# Patient Record
Sex: FEMALE | Race: WHITE
[De-identification: names, ages, dates, MRNs, and addresses within clinical notes are randomized per-mention and may not be internally consistent; named-entity substitution may affect disease eponyms.]

---

## 2019-03-14 ENCOUNTER — HOSPITAL ENCOUNTER (OUTPATIENT)
Dept: HOSPITAL 47 - BARWHC3 | Age: 29
End: 2019-03-14
Attending: SURGERY
Payer: COMMERCIAL

## 2019-03-14 VITALS
DIASTOLIC BLOOD PRESSURE: 77 MMHG | TEMPERATURE: 97.4 F | SYSTOLIC BLOOD PRESSURE: 110 MMHG | HEART RATE: 78 BPM | RESPIRATION RATE: 16 BRPM

## 2019-03-14 VITALS — BODY MASS INDEX: 34.6 KG/M2

## 2019-03-14 DIAGNOSIS — F32.9: ICD-10-CM

## 2019-03-14 DIAGNOSIS — Z87.891: ICD-10-CM

## 2019-03-14 DIAGNOSIS — E28.2: ICD-10-CM

## 2019-03-14 DIAGNOSIS — E66.01: Primary | ICD-10-CM

## 2019-03-14 PROCEDURE — 93005 ELECTROCARDIOGRAM TRACING: CPT

## 2019-03-14 PROCEDURE — 99211 OFF/OP EST MAY X REQ PHY/QHP: CPT

## 2019-03-14 PROCEDURE — 83036 HEMOGLOBIN GLYCOSYLATED A1C: CPT

## 2019-03-14 NOTE — P.HPBAR
Bariatric H&P





- History & Physicial


H&P Date: 03/14/19


History & Physicial: 


Visit/CC: initial visit





Patient initial contact: 





Initial weight: 106.282 kg


Initial weight in pounds: 234.31


Height: 5 ft 7.75 in


Initial BMI: 34.6





Last weight: 





Current weight: 106.282 kg


Current weight in pounds: 234.31


Current BMI: 34.6





Ideal body weight (based on NIH guidelines): 65.771 kg





Excess body weight loss: 0.0%








The patient is a 28 year-old F who presents for Bariatric Assessment.  Patient 

seen today as a new patient bariatric evaluation.  She was at the center 2 days 

ago.  The patient's sister had previously been gastrectomy.  She is interested 

in sleeve gastrectomy at this time.  The patient has struggled with her weight 

fluctuating over the years.  She was having his diet to 80.  This was when she 

was in high school.  She has had episodes in the distant past where she felt she

may be suffering from anorexia.  Patient has issues currently with depression 

PCO S, and infertility related to her weight.  Patient quit smoking 4 months 

ago.  Denies any history of DVT or dysphagia.  No significant reflux symptoms.














Review of Systems





The patient denies any acute changes in vision or hearing, no dysphagia or 

odynophagia, no chest pain or shortness of breath, no dysuria or hematuria, no 

headache, no runny nose, no rectal bleeding or melena, no unexplained weight 

loss 





Past Medical History


Past Medical History: No Reported History


History of Any Multi-Drug Resistant Organisms: None Reported


Past Surgical History: No Surgical Hx Reported


Past Psychological History: No Psychological Hx Reported


Smoking Status: Former smoker


Past Alcohol Use History: Occasional


Past Drug Use History: None Reported





Surgical - Exam


                                   Vital Signs











Temp Pulse Resp BP


 


 97.4 F L  78   16   110/77 


 


 03/14/19 14:41  03/14/19 14:41  03/14/19 14:41  03/14/19 14:41

















Physical exam:


General: Well-developed, well-nourished


HEENT: Normocephalic, sclerae nonicteric


Abdomen: Nontender, nondistended


Extremities: No edema


Neuro: Alert and oriented





Bariatric Assessment & Plan


(1) Morbid obesity


Narrative/Plan: 


Surgical options reviewed in detail.  Patient interested in sleeve gastrectomy. 

Patient's weight has fluctuated significantly over the years.  I do believe she 

is a good candidate for sleeve gastrectomy.  Await primary care physician 

supporting documentation.  Patient will require preoperative EGD.  Patient will 

follow up post endoscopy prior to scheduling.


Status: Acute   





Bariatric Checklist


Checklist: 


Plan: 





Checklist: 





EGD: 


1. Hiatal hernia: 


2. H. Pylori: 





HgbA1c: 





Vitamin D: 





Smoking: Former smoker





Primary care physician referral: dr whitfield





Psychiatry clearance: 





Cardiology clearance: 





Sleep study: 





Diet journal: 





VTE risk score: 





VTE risk level: 





Rehab needs at discharge:

## 2019-03-15 LAB — HBA1C MFR BLD: 4.9 % (ref 4–6)

## 2019-03-20 ENCOUNTER — HOSPITAL ENCOUNTER (OUTPATIENT)
Dept: HOSPITAL 47 - ORWHC2ENDO | Age: 29
Discharge: HOME | End: 2019-03-20
Attending: SURGERY
Payer: COMMERCIAL

## 2019-03-20 VITALS — HEART RATE: 51 BPM | SYSTOLIC BLOOD PRESSURE: 90 MMHG | DIASTOLIC BLOOD PRESSURE: 54 MMHG

## 2019-03-20 VITALS — BODY MASS INDEX: 36.3 KG/M2

## 2019-03-20 VITALS — TEMPERATURE: 97.9 F

## 2019-03-20 VITALS — RESPIRATION RATE: 16 BRPM

## 2019-03-20 DIAGNOSIS — K44.9: ICD-10-CM

## 2019-03-20 DIAGNOSIS — E66.01: ICD-10-CM

## 2019-03-20 DIAGNOSIS — K29.50: ICD-10-CM

## 2019-03-20 DIAGNOSIS — Z87.891: ICD-10-CM

## 2019-03-20 DIAGNOSIS — K21.9: Primary | ICD-10-CM

## 2019-03-20 PROCEDURE — 43239 EGD BIOPSY SINGLE/MULTIPLE: CPT

## 2019-03-20 PROCEDURE — 88305 TISSUE EXAM BY PATHOLOGIST: CPT

## 2019-03-20 PROCEDURE — 81025 URINE PREGNANCY TEST: CPT

## 2019-03-20 NOTE — P.GSHP
History of Present Illness


H&P Date: 03/20/19


Chief Complaint: GERD, presurgical





Patient here today for upper endoscopy.  Patient recently seen in the bariatric 

clinic.  Patient is scheduled for upcoming sleeve gastrectomy.  Minimal reflux 

symptoms.  No dysphagia.





Past Medical History


Past Medical History: No Reported History


Additional Past Medical History / Comment(s): migraines, palpitations,


History of Any Multi-Drug Resistant Organisms: None Reported


Past Surgical History: No Surgical Hx Reported


Additional Past Surgical History / Comment(s): oral surgery


Past Anesthesia/Blood Transfusion Reactions: No Reported Reaction


Smoking Status: Former smoker





- Past Family History


  ** Mother


Family Medical History: No Reported History





Medications and Allergies


                                Home Medications











 Medication  Instructions  Recorded  Confirmed  Type


 


No Known Home Medications  03/15/19 03/19/19 History








                                    Allergies











Allergy/AdvReac Type Severity Reaction Status Date / Time


 


No Known Allergies Allergy   Verified 03/19/19 14:35














Surgical - Exam


                                   Vital Signs











Temp Pulse Resp BP Pulse Ox


 


 97.9 F   67   18   130/75   100 


 


 03/20/19 11:36  03/20/19 11:36  03/20/19 11:36  03/20/19 11:36  03/20/19 11:36

















Physical exam:


General: Well-developed, well-nourished


HEENT: Normocephalic, sclerae nonicteric


Abdomen: Nontender, nondistended


Extremities: No edema


Neuro: Alert and oriented





Assessment and Plan


(1) Morbid obesity


Narrative/Plan: 


Will proceed with upper endoscopy at this time


Current Visit: No   Status: Acute   Code(s): E66.01 - MORBID (SEVERE) OBESITY 

DUE TO EXCESS CALORIES   SNOMED Code(s): 352275200

## 2019-03-20 NOTE — P.PCN
Date of Procedure: 03/20/19


Procedure(s) Performed: 


Preoperative Dx: GERD, presurgical


Postoperative Dx: Mild gastritis, small sliding hiatal hernia


Procedure: EGD with Bx


Anesthesia: Sedation


Endoscopist: Dr. Dior


Specimens: Antrum


Endoscopic Procedure:   The patient was on the endoscopy table in the left 

decubitus position.  The Olympus gastroscope was inserted into the oropharynx 

and passed under direct visualization to the region of the third portion of the 

duodenum.  From that point the scope was slowly withdrawn inspecting all 

surfaces carefully.  There were no neoplastic inflammatory or polypoid lesions 

throughout the duodenum.  The pylorus was widely patent.  The stomach was 

carefully inspected.  There was mild gastritis present.  A biopsy of the antrum 

took place to rule out H. pylori.  Retroflexion revealed a small sliding hiatal 

hernia.  The GE junction was present 1 cm above the diaphragmatic hiatus.  The 

esophagus was then carefully examined.  There were no neoplastic inflammatory or

polypoid lesions throughout the visualized esophagus.  The patient was then 

taken to the recovery room in stable condition per anesthesia guidelines.


Recommendations: Await biopsy results.

## 2019-07-15 ENCOUNTER — HOSPITAL ENCOUNTER (OUTPATIENT)
Dept: HOSPITAL 47 - LABWHC1 | Age: 29
Discharge: HOME | End: 2019-07-15
Attending: SURGERY
Payer: COMMERCIAL

## 2019-07-15 DIAGNOSIS — F17.200: Primary | ICD-10-CM

## 2019-07-15 PROCEDURE — 80323 ALKALOIDS NOS: CPT

## 2019-10-22 ENCOUNTER — HOSPITAL ENCOUNTER (OUTPATIENT)
Dept: HOSPITAL 47 - BARWHC3 | Age: 29
End: 2019-10-22
Attending: SURGERY
Payer: COMMERCIAL

## 2019-10-22 VITALS
SYSTOLIC BLOOD PRESSURE: 115 MMHG | TEMPERATURE: 98.2 F | DIASTOLIC BLOOD PRESSURE: 84 MMHG | HEART RATE: 101 BPM | RESPIRATION RATE: 16 BRPM

## 2019-10-22 VITALS — BODY MASS INDEX: 37.2 KG/M2

## 2019-10-22 DIAGNOSIS — E66.01: Primary | ICD-10-CM

## 2019-10-22 PROCEDURE — 99211 OFF/OP EST MAY X REQ PHY/QHP: CPT

## 2019-10-22 NOTE — P.BASOAP
Subjective


Progress Note Date: 10/22/19


Principal diagnosis: 





Morbid obesity





Patient here today to discuss sleeve gastrectomy.  Patient was seen earlier this

year and actually underwent upper endoscopy and was scheduled for sleeve 

gastrectomy when she had a change in her insurance coverage.  Surgery was held 

at that time.  She is now interested in resuming her weight loss journey.  She 

is interested in proceeding with sleeve gastrectomy in the near future.  No 

significant changes to her history since last evaluation with the exception of 

starting Zoloft and Flonase.  She has obtained cardiac clearance for history of 

possible SVT in the past a low she states she was told it was likely a panic 

attack.  Her weight has gone up from 234-252.  No recent labs.





Objective





- Vital Signs


Vital signs: 


                                   Vital Signs











Temp  98.2 F   10/22/19 15:02


 


Pulse  101 H  10/22/19 15:02


 


Resp  16   10/22/19 15:02


 


BP  115/84   10/22/19 15:02


 


Pulse Ox      








                                 Intake & Output











 10/21/19 10/22/19 10/22/19





 18:59 06:59 18:59


 


Weight   114.305 kg














- Exam





Abdomen: Soft, nontender, nondistended





Assessment/Plan


(1) Morbid obesity


Narrative/Plan: 


Will schedule for sleeve gastrectomy in the near future.  We'll plan hiatal 

herniorrhaphy simultaneous to the procedure.    The risks of bleeding, 

infection, stenosis, stricture, leak, abscess, fistula formation, peritonitis, p

oor weight loss, reflux, vomiting, conversion to an open procedure, aborting 

sleeve gastrectomy, MI, PE, DVT, and death were discussed.  The patient 

understands and wishes to proceed.Check labs drawn at her primary care 

physician's office earlier today.





Plan: 


Date: 10/22/19





Initial Weight: 106.282 kg





Initial BMI: 34.6





Current Weight: 114.305 kg





Current BMI: 37.2





Type of Surgery: 





Total Volume in Band: 





Previous Volume: 





Volume Removed: 





Volume Added: 





Band Size:

## 2019-11-25 ENCOUNTER — HOSPITAL ENCOUNTER (INPATIENT)
Dept: HOSPITAL 47 - 2ORMAIN | Age: 29
LOS: 2 days | Discharge: HOME | DRG: 621 | End: 2019-11-27
Attending: SURGERY | Admitting: SURGERY
Payer: COMMERCIAL

## 2019-11-25 VITALS — BODY MASS INDEX: 36.1 KG/M2

## 2019-11-25 DIAGNOSIS — F32.9: ICD-10-CM

## 2019-11-25 DIAGNOSIS — Z79.51: ICD-10-CM

## 2019-11-25 DIAGNOSIS — Z87.891: ICD-10-CM

## 2019-11-25 DIAGNOSIS — E28.2: ICD-10-CM

## 2019-11-25 DIAGNOSIS — F41.9: ICD-10-CM

## 2019-11-25 DIAGNOSIS — G43.909: ICD-10-CM

## 2019-11-25 DIAGNOSIS — K44.9: ICD-10-CM

## 2019-11-25 DIAGNOSIS — E66.01: Primary | ICD-10-CM

## 2019-11-25 DIAGNOSIS — Z79.899: ICD-10-CM

## 2019-11-25 PROCEDURE — 84100 ASSAY OF PHOSPHORUS: CPT

## 2019-11-25 PROCEDURE — 82565 ASSAY OF CREATININE: CPT

## 2019-11-25 PROCEDURE — 94762 N-INVAS EAR/PLS OXIMTRY CONT: CPT

## 2019-11-25 PROCEDURE — 94640 AIRWAY INHALATION TREATMENT: CPT

## 2019-11-25 PROCEDURE — 88307 TISSUE EXAM BY PATHOLOGIST: CPT

## 2019-11-25 PROCEDURE — 74240 X-RAY XM UPR GI TRC 1CNTRST: CPT

## 2019-11-25 PROCEDURE — 94760 N-INVAS EAR/PLS OXIMETRY 1: CPT

## 2019-11-25 PROCEDURE — 83735 ASSAY OF MAGNESIUM: CPT

## 2019-11-25 PROCEDURE — 84520 ASSAY OF UREA NITROGEN: CPT

## 2019-11-25 PROCEDURE — 85025 COMPLETE CBC W/AUTO DIFF WBC: CPT

## 2019-11-25 PROCEDURE — 81025 URINE PREGNANCY TEST: CPT

## 2019-11-25 PROCEDURE — 82310 ASSAY OF CALCIUM: CPT

## 2019-11-25 PROCEDURE — 80051 ELECTROLYTE PANEL: CPT

## 2019-11-25 PROCEDURE — 0DB64Z3 EXCISION OF STOMACH, PERCUTANEOUS ENDOSCOPIC APPROACH, VERTICAL: ICD-10-PCS

## 2019-11-25 RX ADMIN — HYDROMORPHONE HYDROCHLORIDE PRN MG: 1 INJECTION, SOLUTION INTRAMUSCULAR; INTRAVENOUS; SUBCUTANEOUS at 15:53

## 2019-11-25 RX ADMIN — HYDROMORPHONE HYDROCHLORIDE PRN MG: 1 INJECTION, SOLUTION INTRAMUSCULAR; INTRAVENOUS; SUBCUTANEOUS at 14:27

## 2019-11-25 RX ADMIN — HYDROMORPHONE HYDROCHLORIDE PRN MG: 1 INJECTION, SOLUTION INTRAMUSCULAR; INTRAVENOUS; SUBCUTANEOUS at 16:26

## 2019-11-25 RX ADMIN — ISODIUM CHLORIDE SCH: 0.03 SOLUTION RESPIRATORY (INHALATION) at 17:32

## 2019-11-25 RX ADMIN — ONDANSETRON ONE MG: 2 INJECTION INTRAMUSCULAR; INTRAVENOUS at 10:47

## 2019-11-25 RX ADMIN — POTASSIUM CHLORIDE SCH MLS: 14.9 INJECTION, SOLUTION INTRAVENOUS at 10:46

## 2019-11-25 RX ADMIN — HYDROMORPHONE HYDROCHLORIDE PRN MG: 1 INJECTION, SOLUTION INTRAMUSCULAR; INTRAVENOUS; SUBCUTANEOUS at 22:13

## 2019-11-25 RX ADMIN — HYDROMORPHONE HYDROCHLORIDE PRN MG: 1 INJECTION, SOLUTION INTRAMUSCULAR; INTRAVENOUS; SUBCUTANEOUS at 18:15

## 2019-11-25 RX ADMIN — ISODIUM CHLORIDE SCH MG: 0.03 SOLUTION RESPIRATORY (INHALATION) at 19:44

## 2019-11-25 RX ADMIN — THIAMINE HYDROCHLORIDE SCH: 100 INJECTION, SOLUTION INTRAMUSCULAR; INTRAVENOUS at 17:53

## 2019-11-25 RX ADMIN — SERTRALINE HYDROCHLORIDE SCH MG: 50 TABLET, FILM COATED ORAL at 21:19

## 2019-11-25 RX ADMIN — ONDANSETRON ONE MG: 2 INJECTION INTRAMUSCULAR; INTRAVENOUS at 15:25

## 2019-11-25 RX ADMIN — ONDANSETRON PRN MG: 2 INJECTION INTRAMUSCULAR; INTRAVENOUS at 22:13

## 2019-11-25 RX ADMIN — HYDROMORPHONE HYDROCHLORIDE PRN MG: 1 INJECTION, SOLUTION INTRAMUSCULAR; INTRAVENOUS; SUBCUTANEOUS at 14:32

## 2019-11-25 NOTE — P.GSHP
History of Present Illness


H&P Date: 11/25/19


Chief Complaint: Morbid obesity





29-year-old female known to our service.  Initially seen in March of this year. 

Patient complaining of morbid obesity.  Her sister has had a sleeve gastrectomy 

in the past.  She is interested in only sleeve gastrectomy at this time.  Recent

BMI 35.9.  Patient suffers from depression PCO S and infertility related to her 

weight.  Denies history of DVT or dysphagia.  Minimal reflux symptoms.  Recent 

EGD showed mild gastritis and a small hiatal hernia.





Past Medical History


Past Medical History: No Reported History


Additional Past Medical History / Comment(s): past hx of occasional migraines, 

palpitations,


History of Any Multi-Drug Resistant Organisms: None Reported


Past Surgical History: No Surgical Hx Reported


Additional Past Surgical History / Comment(s): oral surgery, EGD


Past Anesthesia/Blood Transfusion Reactions: No Reported Reaction


Smoking Status: Former smoker





- Past Family History


  ** Mother


Family Medical History: No Reported History





Medications and Allergies


                                Home Medications











 Medication  Instructions  Recorded  Confirmed  Type


 


Fluticasone Nasal [place] [Flonase 2 spray NASAL DAILY 10/23/19 11/21/19 History





Nasal Spray]    


 


Loratadine [Claritin] 10 mg PO DAILY 10/23/19 11/21/19 History


 


Sertraline [Zoloft] 50 mg PO HS 10/23/19 11/21/19 History








                                    Allergies











Allergy/AdvReac Type Severity Reaction Status Date / Time


 


No Known Allergies Allergy   Verified 11/21/19 08:55














Surgical - Exam








Physical exam:


General: Well-developed, well-nourished


HEENT: Normocephalic, sclerae nonicteric


Abdomen: Nontender, nondistended


Extremities: No edema


Neuro: Alert and oriented





Assessment and Plan


(1) Morbid obesity


Narrative/Plan: 


29-year-old female with morbid obesity and recent findings of hiatal hernia.  

We'll proceed with laparoscopic sleeve gastrectomy with hiatal hernia repair.  

The risks of bleeding, infection, stenosis, stricture, leak, abscess, fistula 

formation, peritonitis, poor weight loss, reflux, vomiting, conversion to an 

open procedure, aborting sleeve gastrectomy, MI, PE, DVT, and death were 

discussed.  The patient understands and wishes to proceed.


Current Visit: No   Status: Acute   Code(s): E66.01 - MORBID (SEVERE) OBESITY 

DUE TO EXCESS CALORIES   SNOMED Code(s): 530322817

## 2019-11-25 NOTE — HP
HISTORY AND PHYSICAL



REASON FOR CONSULTATION:

Advice regarding anxiety and depression and other multiple medical issues requested by

Dr. Dior.



PRESENT ILLNESS:

This 29-year-old woman with a past medical history of occasional migraines,

palpitations, anxiety, depression, history of nicotine dependence, being followed by

Dr. Christy Comer in the outpatient setting underwent laparoscopic sleeve gastrectomy

with repair of hiatal hernia by Dr. Dior. Postoperatively, the patient complained of

some pain.  There is no history of palpitations, no history of headache, loss of

consciousness, seizures. No shortness of breath, hematochezia, melena at this time.

The patient is complaining of upper abdominal and lower chest pain as well, radiating

to the back, most likely postoperative.



PAST MEDICAL HISTORY:

History of occasional migraines. History of anxiety, depression, history of nicotine

dependence.



MEDICATIONS:

Prior to admission, home medications are:

1. Zoloft 50 mg p.o. q.h.s.

2. Claritin 10 mg p.o. daily.

3. Nasal spray 2 sprays daily.



ALLERGIES:

None.



FAMILY HISTORY:

No history of heart disease or strokes in the family.



SOCIAL HISTORY:

Previous history of smoking.  No history of current smoking or alcohol intake.



REVIEW OF SYSTEMS:

ENT: No diminished vision. No diminished hearing.

CARDIOVASCULAR: No angina or palpitations.

RESPIRATIONS:  No cough. No hemoptysis.

GI as mentioned earlier.

 no dysuria or hematuria.

NERVOUS SYSTEM: No numbness or weakness.

ALLERGY/IMMUNOLOGY: No asthma or hayfever.

MUSCULOSKELETAL as mentioned earlier.

HEMATOLOGY/ONCOLOGY: No history of anemia.

ENDOCRINE:  No history of diabetes or hypothyroidism.

CONSTITUTIONAL: As mentioned earlier.

DERMATOLOGY:  Negative.

RHEUMATOLOGY negative.

PSYCHIATRY as mentioned earlier.



PHYSICAL EXAM:

Patient is alert, oriented x3.  The pulse is 64.  Blood pressure 126/81, respirations

16, temperature 97.8, pulse ox 91 percent on room air.

HEENT: Conjunctivae normal.  Oral mucosa moist.

NECK is no jugular venous distention.  No carotid bruit. No lymph node enlargement.

Cardiovascular systems: S1, S2 muffled.

RESPIRATIONS: Breath sounds diminished in the bases.  A few scattered rhonchi.  No

crackles.

ABDOMEN:  Soft, obese, status post surgery.

LEGS:  No edema. No swelling.

NERVOUS SYSTEM: Higher functions as mentioned earlier. Moves all 4 limbs.  No focal

motor or sensory deficits.

LYMPHATICS: No lymph nodes palpable in the neck, axillae or groin.

SKIN: No ulcer, no rashes and no bleeding.

JOINTS:  No active deforming arthropathy.



LABS:

Previous labs, preop labs hematology is okay.  CMP is also within normal limits.



ASSESSMENT:

1. Status post laparoscopic sleeve gastrectomy with repair of hiatal hernia.

2. History of migraine.

3. History of palpitations.

4. History of anxiety, depression.

5. Remote history of nicotine dependence.



RECOMMENDATIONS AND DISCUSSION:

In this 29-year-old woman who presented with multiple medical issues, at this time, I

recommend to continue current medications, current management and symptomatic

treatment. Resume the home medications.  DVT prophylaxis.  Pain medications.  Guarded

prognosis because of multiple complex medical issues.  Further recommendations to

follow.  A copy of dictation is being forwarded to Dr. Christy Comer who is the primary

physician.





MMODL / IJN: [id number] / Job#: [id number]

## 2019-11-25 NOTE — P.OP
Date of Procedure: 11/25/19


Procedure(s) Performed: 


PREOPERATIVE DIAGNOSIS: Morbid obesity, PCos, hiatal hernia


POSTOPERATIVE DIAGNOSIS: Same


PROCEDURE: Laparoscopic sleeve gastrectomy with repair hiatal hernia


SURGEON: Barby


EBL: Minimal


ANESTHESIA: General


COMPLICATIONS: None


OPERATIVE PROCEDURE: Patient was placed in the operating table in the supine 

position.  She was placed under general anesthesia at that time.  The abdomen 

was prepped and draped in sterile fashion after the patient was placed in 

lithotomy.  A 5 mm optical trocar was used to enter the abdominal cavity in the 

left upper quadrant.  Insufflation took place to 15 millimeters mercury.  An 

additional right subxiphoid 5 mm trocar was then placed under direct relation 

and then removed.  2 additional 5 mm trochars were placed in the right upper 

quadrant and left upper quadrant under direct visualization and a 15 mm trocar 

in the supraumbilical location.  The liver was retracted using a medium 

Amanda liver retractor through the right subxiphoid trocar site.  The hiatus 

was inspected.  The patient had a small sized hiatal hernia.  Circumferentially 

the phrenoesophageal ligament was incised identifying the actual defect.  The 

right diaphragmatic crura was well visualized.  I was able to bluntly dissect 

and visualize the left diaphragmatic crura.  At that point I moved to the mid 

aspect of the greater curvature the stomach.  The short gastric vasculature was 

divided using a LigaSure device proximally.  I then switched and divided the 

short gastrics distally to a 3-4 cm from the pylorus.  The dissection took place

up to the left diaphragmatic crura at that point.  The posterior short gastrics 

were likewise divided using the LigaSure device.  Once the stomach was fully 

mobilized the blunt tipped 40-Welsh bougie dilator was advanced into the 

stomach and advanced all the way to the prepyloric location.  A black echelon 60

stapler with seam guard was utilized and fired tangentially across the antrum 

taking care to avoid narrowing at the incisura angularis.  Subsequent firings of

the stapler took place.  A total of 4 green echelon 60 staplers with seam guard 

took place proximally staying on the outer edge of our dilator.  Once we reached

the most proximal portion of the stomach a single firing of the gold echelon 60 

stapler without seen guard took place.  This only covered a distance of about 1 

cm. The oral gastric tube was reinserted.  The stomach was insufflated with 

approximately 100 mL of methylene blue.  No evidence of leak or obstruction was 

seen.  The hiatus was then addressed once again.  2 separate 2-0 Ethibond 

sutures were used to reapproximate the crura posteriorly.  This adequately 

closed the diaphragmatic hernia.  Tisseel fibrin glue was then sprayed along the

entire length of the staple line.  No bleeding was identified.  The distal 

aspect of the sleeve was then reapproximated to the gastrosplenic and aj

rocolic ligament using a short running 20 strata fix suture.  This was done to 

prevent kinking or twisting of the sleeve.  The stomach remnant was removed from

the 15 mm trocar site without difficulty.  The fascia at the 15 more site was 

closed using interrupted 0 Vicryl sutures with the laparoscopic suture passer 

and Giovani Sherie technique. The insufflation was evacuated.  The skin at all 

5 incisions were closed using 4-0 Monocryl sutures.  Skin glue was then applied.

## 2019-11-25 NOTE — P.ANPRN
Procedure Note - Anesthesia





- Nerve Block Performed


  ** Bilateral Transversus Abdominis Single


Time Out Performed: Yes


Date of Procedure: 11/25/19


Procedure Start Time: 14:28


Procedure Stop Time: 14:38


Location of Patient: Phase I


Indication: Acute Post-Operative Pain, Requested by Surgeon


Sedation Type: Sedate with meaningful contact maintained


Preparation: Sterile Prep


Position: Supine


Catheter: None


Needle Types: Pajunk


Needle Gauge: 21


Ultrasound used to visualize needle placement: Yes


Ultrasound used to observe medication spread: Yes


Injectate: 0.5% Ropivacaine (see comment for volume) (ropivacaine 0.5% 20 cc + 

decadron 2mg-- per side)


Blood Aspirated: No


Pain Paresthesia on Injection Noted: No


Resistance on Injection: Normal


Image Stored and Saved: Yes


Events: Uneventful and Well Tolerated

## 2019-11-26 LAB
ANION GAP SERPL CALC-SCNC: 11 MMOL/L
BASOPHILS # BLD AUTO: 0 K/UL (ref 0–0.2)
BASOPHILS NFR BLD AUTO: 0 %
BUN SERPL-SCNC: 5 MG/DL (ref 7–17)
CALCIUM SPEC-MCNC: 9 MG/DL (ref 8.4–10.2)
CHLORIDE SERPL-SCNC: 104 MMOL/L (ref 98–107)
CO2 SERPL-SCNC: 23 MMOL/L (ref 22–30)
EOSINOPHIL # BLD AUTO: 0 K/UL (ref 0–0.7)
EOSINOPHIL NFR BLD AUTO: 0 %
ERYTHROCYTE [DISTWIDTH] IN BLOOD BY AUTOMATED COUNT: 4.24 M/UL (ref 3.8–5.4)
ERYTHROCYTE [DISTWIDTH] IN BLOOD: 13.9 % (ref 11.5–15.5)
HCT VFR BLD AUTO: 36.4 % (ref 34–46)
HGB BLD-MCNC: 12.4 GM/DL (ref 11.4–16)
LYMPHOCYTES # SPEC AUTO: 1.8 K/UL (ref 1–4.8)
LYMPHOCYTES NFR SPEC AUTO: 11 %
MAGNESIUM SPEC-SCNC: 1.8 MG/DL (ref 1.6–2.3)
MCH RBC QN AUTO: 29.3 PG (ref 25–35)
MCHC RBC AUTO-ENTMCNC: 34.1 G/DL (ref 31–37)
MCV RBC AUTO: 85.9 FL (ref 80–100)
MONOCYTES # BLD AUTO: 0.6 K/UL (ref 0–1)
MONOCYTES NFR BLD AUTO: 3 %
NEUTROPHILS # BLD AUTO: 13.8 K/UL (ref 1.3–7.7)
NEUTROPHILS NFR BLD AUTO: 85 %
PLATELET # BLD AUTO: 388 K/UL (ref 150–450)
POTASSIUM SERPL-SCNC: 4.1 MMOL/L (ref 3.5–5.1)
SODIUM SERPL-SCNC: 138 MMOL/L (ref 137–145)
WBC # BLD AUTO: 16.3 K/UL (ref 3.8–10.6)

## 2019-11-26 RX ADMIN — FLUTICASONE PROPIONATE SCH: 50 SPRAY, METERED NASAL at 08:14

## 2019-11-26 RX ADMIN — PANTOPRAZOLE SODIUM SCH MG: 40 INJECTION, POWDER, FOR SOLUTION INTRAVENOUS at 08:14

## 2019-11-26 RX ADMIN — ENOXAPARIN SODIUM SCH MG: 40 INJECTION SUBCUTANEOUS at 14:02

## 2019-11-26 RX ADMIN — HYDROMORPHONE HYDROCHLORIDE PRN MG: 1 INJECTION, SOLUTION INTRAMUSCULAR; INTRAVENOUS; SUBCUTANEOUS at 05:54

## 2019-11-26 RX ADMIN — THIAMINE HYDROCHLORIDE SCH: 100 INJECTION, SOLUTION INTRAMUSCULAR; INTRAVENOUS at 06:13

## 2019-11-26 RX ADMIN — ENOXAPARIN SODIUM SCH: 40 INJECTION SUBCUTANEOUS at 05:36

## 2019-11-26 RX ADMIN — METOCLOPRAMIDE PRN MG: 5 INJECTION, SOLUTION INTRAMUSCULAR; INTRAVENOUS at 20:30

## 2019-11-26 RX ADMIN — HYOSCYAMINE SULFATE PRN MG: 0.12 SOLUTION/ DROPS ORAL at 20:40

## 2019-11-26 RX ADMIN — HYDROMORPHONE HYDROCHLORIDE PRN MG: 1 INJECTION, SOLUTION INTRAMUSCULAR; INTRAVENOUS; SUBCUTANEOUS at 11:16

## 2019-11-26 RX ADMIN — SERTRALINE HYDROCHLORIDE SCH MG: 50 TABLET, FILM COATED ORAL at 19:10

## 2019-11-26 RX ADMIN — HYDROMORPHONE HYDROCHLORIDE PRN MG: 1 INJECTION, SOLUTION INTRAMUSCULAR; INTRAVENOUS; SUBCUTANEOUS at 08:14

## 2019-11-26 RX ADMIN — ISODIUM CHLORIDE SCH: 0.03 SOLUTION RESPIRATORY (INHALATION) at 11:37

## 2019-11-26 RX ADMIN — HYOSCYAMINE SULFATE PRN MG: 0.12 SOLUTION/ DROPS ORAL at 14:55

## 2019-11-26 RX ADMIN — POTASSIUM CHLORIDE SCH: 14.9 INJECTION, SOLUTION INTRAVENOUS at 06:13

## 2019-11-26 RX ADMIN — ONDANSETRON PRN MG: 2 INJECTION INTRAMUSCULAR; INTRAVENOUS at 05:53

## 2019-11-26 RX ADMIN — DIMETHICONE PRN MG: 80 TABLET, CHEWABLE ORAL at 19:12

## 2019-11-26 RX ADMIN — ONDANSETRON PRN MG: 2 INJECTION INTRAMUSCULAR; INTRAVENOUS at 11:16

## 2019-11-26 RX ADMIN — LEUCINE, PHENYLALANINE, LYSINE, METHIONINE, ISOLEUCINE, VALINE, HISTIDINE, THREONINE, TRYPTOPHAN, ALANINE, GLYCINE, ARGININE, PROLINE, SERINE, TYROSINE, SODIUM ACETATE, DIBASIC POTASSIUM PHOSPHATE, MAGNESIUM CHLORIDE, SODIUM CHLORIDE, CALCIUM CHLORIDE, DEXTROSE SCH MLS/HR
365; 280; 290; 200; 300; 290; 240; 210; 90; 1035; 515; 575; 340; 250; 20; 340; 261; 51; 59; 33; 15 INJECTION INTRAVENOUS at 18:57

## 2019-11-26 RX ADMIN — ISODIUM CHLORIDE SCH MG: 0.03 SOLUTION RESPIRATORY (INHALATION) at 15:41

## 2019-11-26 RX ADMIN — ISODIUM CHLORIDE SCH MG: 0.03 SOLUTION RESPIRATORY (INHALATION) at 21:01

## 2019-11-26 RX ADMIN — KETOROLAC TROMETHAMINE PRN MG: 30 INJECTION, SOLUTION INTRAMUSCULAR at 14:09

## 2019-11-26 RX ADMIN — LEUCINE, PHENYLALANINE, LYSINE, METHIONINE, ISOLEUCINE, VALINE, HISTIDINE, THREONINE, TRYPTOPHAN, ALANINE, GLYCINE, ARGININE, PROLINE, SERINE, TYROSINE, SODIUM ACETATE, DIBASIC POTASSIUM PHOSPHATE, MAGNESIUM CHLORIDE, SODIUM CHLORIDE, CALCIUM CHLORIDE, DEXTROSE SCH MLS/HR
365; 280; 290; 200; 300; 290; 240; 210; 90; 1035; 515; 575; 340; 250; 20; 340; 261; 51; 59; 33; 15 INJECTION INTRAVENOUS at 08:09

## 2019-11-26 RX ADMIN — METOCLOPRAMIDE PRN MG: 5 INJECTION, SOLUTION INTRAMUSCULAR; INTRAVENOUS at 14:01

## 2019-11-26 RX ADMIN — ISODIUM CHLORIDE SCH MG: 0.03 SOLUTION RESPIRATORY (INHALATION) at 07:13

## 2019-11-26 RX ADMIN — THIAMINE HYDROCHLORIDE SCH: 100 INJECTION, SOLUTION INTRAMUSCULAR; INTRAVENOUS at 05:36

## 2019-11-26 RX ADMIN — KETOROLAC TROMETHAMINE PRN MG: 30 INJECTION, SOLUTION INTRAMUSCULAR at 20:30

## 2019-11-26 NOTE — FL
EXAMINATION TYPE: FL UGI

 

DATE OF EXAM: 11/26/2019

 

CLINICAL HISTORY:  Status post gastric sleeve

 

Contrast: Omnipaque 350 50 mL

 

The patient ingested contrast without difficulty or delay.  Noted are postsurgical changes of gastric
 sleeve.   There is no evidence for leak or obstruction.  Contrast is noted within the duodenum.    

 

IMPRESSION: Post-surgical change of  gastric sleeve without evidence for obstruction or leak at this 
point in time.

## 2019-11-26 NOTE — P.PN
<Sravani Lorenzo LINWOOD - Last Filed: 11/26/19 12:28>





Subjective


Progress Note Date: 11/26/19





CHIEF COMPLAINT: morbid obesity





HISTORY OF PRESENT ILLNESS: Patient is status post laparoscopic sleeve 

gastrectomy with hiatal hernia repair.  Postop day #1.  Patient examined this 

where the bedside.  She reports significant nausea.  She reports having dry 

heaves this morning but no actual emesis.  She complains of gas pain and 

belching.  She denies passing flatus.  Postoperative esophagram negative for 

leak or obstruction.





PHYSICAL EXAM: 


VITAL SIGNS: Reviewed.


GENERAL: Well-developed in no acute distress. 


HEENT:  No sclera icterus. Extraocular movements grossly intact.  Moist buccal 

mucosa. Head is atraumatic, normocephalic. 


ABDOMEN:  Soft.  Nondistended.  Appropriate surgical tenderness.  Laparoscopic 

incision sites clean dry and intact.


NEUROLOGIC: Alert and oriented. Cranial nerves II through XII grossly intact.





ASSESSMENT: 


1.  Morbid obesity, status post laparoscopic sleeve gastrectomy and hiatal 

hernia repair





PLAN: 


1. Continue NPO except for ice chips due to nausea. Continue IV fluids. May 

begin clear liquids if nausea improves throughout the day


2. Continue Zofran. Begin Reglan PRN


3. Increase activity as tolerated


4. Incentive spirometry


5. Pain control








Nurse practitioner note has been reviewed by physician. Signing provider agrees 

with the documented findings, assessment, and plan of care. 








Objective





- Vital Signs


Vital signs: 


                                   Vital Signs











Temp  97.8 F   11/26/19 07:00


 


Pulse  72   11/26/19 07:28


 


Resp  18   11/26/19 07:00


 


BP  135/80   11/26/19 07:00


 


Pulse Ox  96   11/26/19 11:38








                                 Intake & Output











 11/25/19 11/26/19 11/26/19





 18:59 06:59 18:59


 


Intake Total 2150  


 


Output Total 10  


 


Balance 2140  


 


Weight 111 kg  111 kg


 


Intake:   


 


  IV 2150  


 


Output:   


 


  Estimated Blood Loss 10  


 


Other:   


 


  Voiding Method  Toilet Toilet


 


  # Voids  3 1














- Labs


CBC & Chem 7: 


                                 11/26/19 07:46





                                 11/26/19 07:46


Labs: 


                  Abnormal Lab Results - Last 24 Hours (Table)











  11/26/19 11/26/19 Range/Units





  07:46 07:46 


 


WBC  16.3 H   (3.8-10.6)  k/uL


 


Neutrophils #  13.8 H   (1.3-7.7)  k/uL


 


BUN   5 L  (7-17)  mg/dL














<BarbySumanth - Last Filed: 11/26/19 19:44>





Subjective


As above.  Patient doing better.  Her nausea seems improved.  Continue bariatric

clear liquids.  Ambulate.  Discharge late tomorrow or Thursday.








Objective





- Vital Signs


Vital signs: 


                                   Vital Signs











Temp  98.4 F   11/26/19 15:00


 


Pulse  64   11/26/19 15:51


 


Resp  17   11/26/19 15:00


 


BP  125/77   11/26/19 15:00


 


Pulse Ox  95   11/26/19 15:00








                                 Intake & Output











 11/26/19 11/26/19 11/27/19





 06:59 18:59 06:59


 


Intake Total  1021.2 


 


Balance  1021.2 


 


Weight  111 kg 


 


Intake:   


 


  Intake, IV Titration  1021.2 





  Amount   


 


    Mvi, Adult No.4 with Vit  1021.2 





    K 10 ml Thiamine 100 mg   





    Folic Acid 1 mg Potassium   





    Chloride 20 meq In   





    Sodium Chloride 0.9% 1,   





    000 ml @ 100 mls/hr IV .   





    BY DURATION [facility] Rx#:   





    [id number]   


 


Other:   


 


  Voiding Method Toilet Toilet 


 


  # Voids 3 1 














- Labs


CBC & Chem 7: 


                                 11/26/19 07:46





                                 11/26/19 07:46


Labs: 


                  Abnormal Lab Results - Last 24 Hours (Table)











  11/26/19 11/26/19 Range/Units





  07:46 07:46 


 


WBC  16.3 H   (3.8-10.6)  k/uL


 


Neutrophils #  13.8 H   (1.3-7.7)  k/uL


 


BUN   5 L  (7-17)  mg/dL














Assessment and Plan


(1) Morbid obesity


Current Visit: Yes   Status: Acute   Code(s): E66.01 - MORBID (SEVERE) OBESITY 

DUE TO EXCESS CALORIES   SNOMED Code(s): 567877307

## 2019-11-26 NOTE — PN
PROGRESS NOTE



DATE OF SERVICE:

11/26/2019.



This 29-year-old woman who was admitted after laparoscopic sleeve gastrectomy is being

closely monitored at this time.  Upper GI series showed postsurgical changes.  No chest

pain.  No palpitations.  No fever.



EXAM:

Alert and oriented x3.  Pulse is 57, blood pressure 127/77, respiration 17, temperature

98.4, pulse ox 94% on room air.

HEENT: Conjunctivae normal.

NECK: No jugular venous distention.

CARDIOVASCULAR SYSTEM:  S1, S2 muffled.

RESPIRATORY SYSTEM: Breath sounds diminished at the bases.  A few rhonchi.  No

crackles.

ABDOMEN soft, status post surgery.

LEGS are no edema. No swelling.

CENTRAL NERVOUS SYSTEM: No focal deficits.



LABS:

WBC 16.2, hemoglobin 12.4.



ASSESSMENT:

1. Status post laparoscopic sleeve gastrectomy with repair of hiatal hernia.

2. History of migraine.

3. History of palpitations.

4. History of anxiety, depression.

5. Remote history of nicotine dependence.



RECOMMENDATIONS AND DISCUSSION:

Recommend to continue current medications, management and symptomatic treatment,

continue to monitor.  Otherwise continue incentive spirometry.  Continue the rest of

the medications.  DVT prophylaxis.  Further recommendations to follow.



MMODL / IJN: [id number] / Job#: [id number]

## 2019-11-27 ENCOUNTER — HOSPITAL ENCOUNTER (OUTPATIENT)
Dept: HOSPITAL 47 - EC | Age: 29
Setting detail: OBSERVATION
LOS: 2 days | End: 2019-11-29
Attending: SURGERY | Admitting: SURGERY
Payer: COMMERCIAL

## 2019-11-27 VITALS — RESPIRATION RATE: 17 BRPM | DIASTOLIC BLOOD PRESSURE: 63 MMHG | SYSTOLIC BLOOD PRESSURE: 107 MMHG | TEMPERATURE: 98.5 F

## 2019-11-27 VITALS — HEART RATE: 68 BPM

## 2019-11-27 DIAGNOSIS — F32.9: ICD-10-CM

## 2019-11-27 DIAGNOSIS — Z79.899: ICD-10-CM

## 2019-11-27 DIAGNOSIS — Z87.891: ICD-10-CM

## 2019-11-27 DIAGNOSIS — Z86.69: ICD-10-CM

## 2019-11-27 DIAGNOSIS — F41.9: ICD-10-CM

## 2019-11-27 DIAGNOSIS — G89.18: Primary | ICD-10-CM

## 2019-11-27 DIAGNOSIS — Z98.84: ICD-10-CM

## 2019-11-27 DIAGNOSIS — Z98.890: ICD-10-CM

## 2019-11-27 LAB
BASOPHILS # BLD AUTO: 0 K/UL (ref 0–0.2)
BASOPHILS NFR BLD AUTO: 0 %
EOSINOPHIL # BLD AUTO: 0 K/UL (ref 0–0.7)
EOSINOPHIL NFR BLD AUTO: 0 %
ERYTHROCYTE [DISTWIDTH] IN BLOOD BY AUTOMATED COUNT: 4.11 M/UL (ref 3.8–5.4)
ERYTHROCYTE [DISTWIDTH] IN BLOOD: 14.1 % (ref 11.5–15.5)
HCT VFR BLD AUTO: 35.3 % (ref 34–46)
HGB BLD-MCNC: 11.4 GM/DL (ref 11.4–16)
LYMPHOCYTES # SPEC AUTO: 1.8 K/UL (ref 1–4.8)
LYMPHOCYTES NFR SPEC AUTO: 25 %
MCH RBC QN AUTO: 27.8 PG (ref 25–35)
MCHC RBC AUTO-ENTMCNC: 32.4 G/DL (ref 31–37)
MCV RBC AUTO: 85.8 FL (ref 80–100)
MONOCYTES # BLD AUTO: 0.3 K/UL (ref 0–1)
MONOCYTES NFR BLD AUTO: 4 %
NEUTROPHILS # BLD AUTO: 5.2 K/UL (ref 1.3–7.7)
NEUTROPHILS NFR BLD AUTO: 69 %
PLATELET # BLD AUTO: 304 K/UL (ref 150–450)
WBC # BLD AUTO: 7.5 K/UL (ref 3.8–10.6)

## 2019-11-27 PROCEDURE — 96376 TX/PRO/DX INJ SAME DRUG ADON: CPT

## 2019-11-27 PROCEDURE — 81001 URINALYSIS AUTO W/SCOPE: CPT

## 2019-11-27 PROCEDURE — 74176 CT ABD & PELVIS W/O CONTRAST: CPT

## 2019-11-27 PROCEDURE — 81025 URINE PREGNANCY TEST: CPT

## 2019-11-27 PROCEDURE — 36415 COLL VENOUS BLD VENIPUNCTURE: CPT

## 2019-11-27 PROCEDURE — 99285 EMERGENCY DEPT VISIT HI MDM: CPT

## 2019-11-27 PROCEDURE — 80053 COMPREHEN METABOLIC PANEL: CPT

## 2019-11-27 PROCEDURE — 85025 COMPLETE CBC W/AUTO DIFF WBC: CPT

## 2019-11-27 PROCEDURE — 83690 ASSAY OF LIPASE: CPT

## 2019-11-27 PROCEDURE — 96374 THER/PROPH/DIAG INJ IV PUSH: CPT

## 2019-11-27 PROCEDURE — 87086 URINE CULTURE/COLONY COUNT: CPT

## 2019-11-27 PROCEDURE — 96375 TX/PRO/DX INJ NEW DRUG ADDON: CPT

## 2019-11-27 PROCEDURE — 82150 ASSAY OF AMYLASE: CPT

## 2019-11-27 RX ADMIN — DIMETHICONE PRN MG: 80 TABLET, CHEWABLE ORAL at 10:08

## 2019-11-27 RX ADMIN — FLUTICASONE PROPIONATE SCH SPRAY: 50 SPRAY, METERED NASAL at 07:29

## 2019-11-27 RX ADMIN — HYOSCYAMINE SULFATE PRN MG: 0.12 SOLUTION/ DROPS ORAL at 03:11

## 2019-11-27 RX ADMIN — KETOROLAC TROMETHAMINE PRN MG: 30 INJECTION, SOLUTION INTRAMUSCULAR at 11:29

## 2019-11-27 RX ADMIN — HYOSCYAMINE SULFATE PRN MG: 0.12 SOLUTION/ DROPS ORAL at 10:08

## 2019-11-27 RX ADMIN — METOCLOPRAMIDE PRN MG: 5 INJECTION, SOLUTION INTRAMUSCULAR; INTRAVENOUS at 03:11

## 2019-11-27 RX ADMIN — METOCLOPRAMIDE PRN MG: 5 INJECTION, SOLUTION INTRAMUSCULAR; INTRAVENOUS at 11:32

## 2019-11-27 RX ADMIN — PANTOPRAZOLE SODIUM SCH MG: 40 INJECTION, POWDER, FOR SOLUTION INTRAVENOUS at 07:29

## 2019-11-27 RX ADMIN — ISODIUM CHLORIDE SCH MG: 0.03 SOLUTION RESPIRATORY (INHALATION) at 07:13

## 2019-11-27 RX ADMIN — ISODIUM CHLORIDE SCH MG: 0.03 SOLUTION RESPIRATORY (INHALATION) at 11:46

## 2019-11-27 RX ADMIN — ISODIUM CHLORIDE SCH: 0.03 SOLUTION RESPIRATORY (INHALATION) at 16:43

## 2019-11-27 RX ADMIN — KETOROLAC TROMETHAMINE PRN MG: 30 INJECTION, SOLUTION INTRAMUSCULAR at 03:11

## 2019-11-27 RX ADMIN — ENOXAPARIN SODIUM SCH MG: 40 INJECTION SUBCUTANEOUS at 03:11

## 2019-11-27 RX ADMIN — LEUCINE, PHENYLALANINE, LYSINE, METHIONINE, ISOLEUCINE, VALINE, HISTIDINE, THREONINE, TRYPTOPHAN, ALANINE, GLYCINE, ARGININE, PROLINE, SERINE, TYROSINE, SODIUM ACETATE, DIBASIC POTASSIUM PHOSPHATE, MAGNESIUM CHLORIDE, SODIUM CHLORIDE, CALCIUM CHLORIDE, DEXTROSE SCH MLS/HR
365; 280; 290; 200; 300; 290; 240; 210; 90; 1035; 515; 575; 340; 250; 20; 340; 261; 51; 59; 33; 15 INJECTION INTRAVENOUS at 05:48

## 2019-11-27 NOTE — P.PN
Subjective


Progress Note Date: 11/27/19


Principal diagnosis: 





Morbid obesity





Patient doing better today.  Still with some gassy discomfort.  White blood cell

count normal.  12 ounces of liquid so far today.





Objective





- Vital Signs


Vital signs: 


                                   Vital Signs











Temp  98.5 F   11/27/19 07:00


 


Pulse  68   11/27/19 11:57


 


Resp  17   11/27/19 08:00


 


BP  107/63   11/27/19 07:00


 


Pulse Ox  94 L  11/27/19 07:00








                                 Intake & Output











 11/26/19 11/27/19 11/27/19





 18:59 06:59 18:59


 


Intake Total 1021.2 1000 


 


Balance 1021.2 1000 


 


Weight 111 kg  


 


Intake:   


 


  Intake, IV Titration 1021.2 1000 





  Amount   


 


    0.9% NaCl with KCl 20 Meq  1000 





    /l 1,000 ml @ 100 mls/hr   





    IV .BY DURATION [facility] Rx#:   





    [id number]   


 


    Mvi, Adult No.4 with Vit 1021.2  





    K 10 ml Thiamine 100 mg   





    Folic Acid 1 mg Potassium   





    Chloride 20 meq In   





    Sodium Chloride 0.9% 1,   





    000 ml @ 100 mls/hr IV .   





    BY DURATION [facility] Rx#:   





    [id number]   


 


Other:   


 


  Voiding Method Toilet Toilet Toilet


 


  # Voids 1 2 














- Exam





Abdomen: Soft, nondistended, incisions clean and dry





- Labs


CBC & Chem 7: 


                                 11/27/19 10:38





                                 11/26/19 07:46





Assessment and Plan


(1) Morbid obesity


Narrative/Plan: 


Continue bariatric clears.  Monitor intake today.  Possible discharge later 

today or tomorrow.


Current Visit: Yes   Status: Acute   Code(s): E66.01 - MORBID (SEVERE) OBESITY 

DUE TO EXCESS CALORIES   SNOMED Code(s): 133393372

## 2019-11-27 NOTE — P.DS
Providers


Date of admission: 


11/25/19 09:34





Expected date of discharge: 11/27/19


Attending physician: 


Sumanth Dior





Consults: 





                                        





11/25/19 14:09


Consult Physician Routine 


   Consulting Provider: Carlos Napoles


   Consult Reason/Comments: Medical management


   Do you want consulting provider notified?: Yes











Primary care physician: 


Christy Comer








- Discharge Diagnosis(es)


(1) Morbid obesity


Refer to today's progress note.  Patient minute for sleeve gastrectomy.  Doing 

well at this time.  Tolerating liquids.  Will discharge.  Follow-up one week.


Status: Acute   





Plan - Discharge Summary


Discharge Rx Participant: No


New Discharge Prescriptions: 


New


   Bisacodyl [Dulcolax] 5 mg PO DAILY PRN #10 tablet.


     PRN Reason: Constipation


   Simethicone 40 mg/0.6 ml Drops [Mylicon Drops] 40 mg PO PCHS PRN #30 ml


     PRN Reason: gas


   Hydrocodone/Acetaminophen [Norco 5-325] 1 tab PO Q6HR PRN 3 Days #12 tab


     PRN Reason: Pain


   Omeprazole 40 mg PO DAILY #30 cap


   Ondansetron Odt [Zofran Odt] 4 mg PO Q8HR PRN #9 tab


     PRN Reason: Nausea





Continue


   Sertraline [Zoloft] 50 mg PO HS


   Fluticasone Nasal Spray [Flonase Nasal Spray] 2 spray NASAL DAILY


   Loratadine [Claritin] 10 mg PO DAILY


Discharge Medication List





Fluticasone Nasal Spray [Flonase Nasal Spray] 2 spray NASAL DAILY 10/23/19 

[History]


Loratadine [Claritin] 10 mg PO DAILY 10/23/19 [History]


Sertraline [Zoloft] 50 mg PO HS 10/23/19 [History]


Bisacodyl [Dulcolax] 5 mg PO DAILY PRN #10 tablet. 11/26/19 [Rx]


Hydrocodone/Acetaminophen [Norco 5-325] 1 tab PO Q6HR PRN 3 Days #12 tab 

11/26/19 [Rx]


Omeprazole 40 mg PO DAILY #30 cap 11/26/19 [Rx]


Ondansetron Odt [Zofran Odt] 4 mg PO Q8HR PRN #9 tab 11/26/19 [Rx]


Simethicone 40 mg/0.6 ml Drops [Mylicon Drops] 40 mg PO PCHS PRN #30 ml 11/26/19

[Rx]








Follow up Appointment(s)/Referral(s): 


[facility] [[facility]] - 1 Week


Patient Instructions/Handouts:  Laparoscopic Sleeve Gastrectomy (DC)


Activity/Diet/Wound Care/Special Instructions: 


No driving while taking Norco


No lifting over 10 pounds


You may shower. No soaking or tub baths


Very light activity until you are reevaluated at your follow up appointment with

your surgeon


Discharge Disposition: HOME SELF-CARE

## 2019-11-27 NOTE — PN
PROGRESS NOTE



DATE OF SERVICE:

11/27/2019



This is a 29-year-old woman who was admitted after laparoscopic sleeve gastrectomy with

repair of hiatal hernia, is improving significantly.  No chest pain.  No palpitations.

No fever.



PHYSICAL EXAM:

Alert and oriented x3.  Pulse is 75, blood pressure is 107/73, respirations 17,

temperature 98.4, pulse ox 94% on room air.

HEENT: Conjunctivae normal.

NECK: No jugular venous distention.

CARDIOVASCULAR SYSTEM:  S1, S2 muffled.

RESPIRATORY SYSTEM: Breath sounds diminished at the bases, a few rhonchi, no crackles.

ABDOMEN:  Soft, status post surgery.

LEGS:  No edema, no swelling.



LABS:

WBC is 11.2, hemoglobin is 11.4.



ASSESSMENT:

1. Status post laparoscopic sleeve gastrectomy with repair of hiatal hernia.

2. History of migraines.

3. History of palpitation.

4. History of anxiety, depression.

5. Remote history of nicotine dependence.



RECOMMENDATION:

Recommend to continue current medications, continue with the monitoring and symptomatic

treatment, continue incentive spirometry.  Resume the home medications.  Closely follow

up with Dr. Comer, who is the primary physician after discharge. Further

recommendations to follow.





MMODL / IJN: [id number] / Job#: [id number]

## 2019-11-28 LAB
ALBUMIN SERPL-MCNC: 4.3 G/DL (ref 3.5–5)
ALP SERPL-CCNC: 67 U/L (ref 38–126)
ALT SERPL-CCNC: 72 U/L (ref 9–52)
AMYLASE SERPL-CCNC: 38 U/L (ref 30–110)
ANION GAP SERPL CALC-SCNC: 9 MMOL/L
AST SERPL-CCNC: 80 U/L (ref 14–36)
BASOPHILS # BLD AUTO: 0 K/UL (ref 0–0.2)
BASOPHILS NFR BLD AUTO: 0 %
BUN SERPL-SCNC: 7 MG/DL (ref 7–17)
CALCIUM SPEC-MCNC: 9.1 MG/DL (ref 8.4–10.2)
CHLORIDE SERPL-SCNC: 108 MMOL/L (ref 98–107)
CO2 SERPL-SCNC: 21 MMOL/L (ref 22–30)
EOSINOPHIL # BLD AUTO: 0 K/UL (ref 0–0.7)
EOSINOPHIL NFR BLD AUTO: 0 %
ERYTHROCYTE [DISTWIDTH] IN BLOOD BY AUTOMATED COUNT: 4.08 M/UL (ref 3.8–5.4)
ERYTHROCYTE [DISTWIDTH] IN BLOOD: 13.9 % (ref 11.5–15.5)
GLUCOSE SERPL-MCNC: 83 MG/DL (ref 74–99)
HCT VFR BLD AUTO: 34.6 % (ref 34–46)
HGB BLD-MCNC: 11.7 GM/DL (ref 11.4–16)
KETONES UR QL STRIP.AUTO: (no result)
LYMPHOCYTES # SPEC AUTO: 2.5 K/UL (ref 1–4.8)
LYMPHOCYTES NFR SPEC AUTO: 26 %
MCH RBC QN AUTO: 28.8 PG (ref 25–35)
MCHC RBC AUTO-ENTMCNC: 33.9 G/DL (ref 31–37)
MCV RBC AUTO: 84.9 FL (ref 80–100)
MONOCYTES # BLD AUTO: 0.5 K/UL (ref 0–1)
MONOCYTES NFR BLD AUTO: 5 %
NEUTROPHILS # BLD AUTO: 6.3 K/UL (ref 1.3–7.7)
NEUTROPHILS NFR BLD AUTO: 67 %
PH UR: 6 [PH] (ref 5–8)
PLATELET # BLD AUTO: 326 K/UL (ref 150–450)
POTASSIUM SERPL-SCNC: 4.6 MMOL/L (ref 3.5–5.1)
PROT SERPL-MCNC: 7.5 G/DL (ref 6.3–8.2)
RBC UR QL: 3 /HPF (ref 0–5)
SODIUM SERPL-SCNC: 138 MMOL/L (ref 137–145)
SP GR UR: 1.02 (ref 1–1.03)
SQUAMOUS UR QL AUTO: 11 /HPF (ref 0–4)
UROBILINOGEN UR QL STRIP: 2 MG/DL (ref ?–2)
WBC # BLD AUTO: 9.4 K/UL (ref 3.8–10.6)
WBC # UR AUTO: 12 /HPF (ref 0–5)

## 2019-11-28 RX ADMIN — HYDROMORPHONE HYDROCHLORIDE PRN MG: 1 INJECTION, SOLUTION INTRAMUSCULAR; INTRAVENOUS; SUBCUTANEOUS at 03:04

## 2019-11-28 RX ADMIN — FLUTICASONE PROPIONATE SCH SPRAY: 50 SPRAY, METERED NASAL at 08:56

## 2019-11-28 RX ADMIN — SIMETHICONE PRN MG: 20 SUSPENSION/ DROPS ORAL at 19:00

## 2019-11-28 RX ADMIN — HYDROMORPHONE HYDROCHLORIDE PRN MG: 1 INJECTION, SOLUTION INTRAMUSCULAR; INTRAVENOUS; SUBCUTANEOUS at 14:18

## 2019-11-28 RX ADMIN — SIMETHICONE PRN MG: 20 SUSPENSION/ DROPS ORAL at 09:00

## 2019-11-28 RX ADMIN — PANTOPRAZOLE SODIUM SCH MG: 40 TABLET, DELAYED RELEASE ORAL at 08:56

## 2019-11-28 RX ADMIN — FAMOTIDINE SCH MG: 20 TABLET, FILM COATED ORAL at 09:00

## 2019-11-28 RX ADMIN — CEFAZOLIN SCH MLS/HR: 330 INJECTION, POWDER, FOR SOLUTION INTRAMUSCULAR; INTRAVENOUS at 11:14

## 2019-11-28 RX ADMIN — CEFAZOLIN SCH MLS/HR: 330 INJECTION, POWDER, FOR SOLUTION INTRAMUSCULAR; INTRAVENOUS at 20:10

## 2019-11-28 RX ADMIN — FAMOTIDINE SCH MG: 20 TABLET, FILM COATED ORAL at 20:10

## 2019-11-28 RX ADMIN — HYOSCYAMINE SULFATE PRN MG: 0.12 SOLUTION/ DROPS ORAL at 14:17

## 2019-11-28 RX ADMIN — HYDROMORPHONE HYDROCHLORIDE PRN MG: 1 INJECTION, SOLUTION INTRAMUSCULAR; INTRAVENOUS; SUBCUTANEOUS at 11:12

## 2019-11-28 RX ADMIN — LORATADINE SCH MG: 10 TABLET ORAL at 08:56

## 2019-11-28 RX ADMIN — KETOROLAC TROMETHAMINE PRN MG: 30 INJECTION, SOLUTION INTRAMUSCULAR at 18:58

## 2019-11-28 RX ADMIN — CEFAZOLIN SCH MLS/HR: 330 INJECTION, POWDER, FOR SOLUTION INTRAMUSCULAR; INTRAVENOUS at 03:08

## 2019-11-28 RX ADMIN — FAMOTIDINE SCH MG: 20 TABLET, FILM COATED ORAL at 08:56

## 2019-11-28 RX ADMIN — CEFAZOLIN SCH MLS/HR: 330 INJECTION, POWDER, FOR SOLUTION INTRAMUSCULAR; INTRAVENOUS at 21:27

## 2019-11-28 RX ADMIN — METOCLOPRAMIDE PRN MG: 5 INJECTION, SOLUTION INTRAMUSCULAR; INTRAVENOUS at 18:58

## 2019-11-28 NOTE — ED
Abdominal Pain HPI





- General


Chief Complaint: Abdominal Pain


Stated Complaint: post-op, abdominal pain


Time Seen by Provider: 11/28/19 00:17


Source: patient


Mode of arrival: ambulatory


Limitations: no limitations





- History of Present Illness


Initial Comments: 





This patient is a 29-year-old woman who presents with complaint of diffuse 

abdominal pain that is been getting worse over the course of the evening and 

tonight.  The patient did have a gastric sleeve surgery here by zoraida Powers.  

She states that she was feeling pretty good so she requested to go home this 

afternoon although she was scheduled to be in the hospital through tomorrow.  

She states that after she got home, she started having diffuse cramping type 

abdominal pain.  She did take one Norco at around 6 PM, and then one again that 

11:30 PM, and states that those did not seem to be relieving her symptoms.  She 

also has had a little bit of nausea.  No fever.  No cough or dyspnea.  No chest 

pain or palpitations.  No difficulty with urination.


MD Complaint: abdominal pain


-: hour(s)


Location: diffuse


Radiation: none


Migration to: periumbilical


Severity: moderate


Quality: cramping


Consistency: constant


Improves With: other (Walking or rocking)


Worsens With: nothing


Associated Symptoms: nausea





- Related Data


Patient Pregnant: No


                                Home Medications











 Medication  Instructions  Recorded  Confirmed


 


Fluticasone Nasal [place] [Flonase 2 spray NASAL DAILY 10/23/19 12/03/19





Nasal Spray]   


 


Loratadine [Claritin] 10 mg PO DAILY 10/23/19 12/03/19


 


Sertraline [Zoloft] 50 mg PO HS 10/23/19 12/03/19








                                  Previous Rx's











 Medication  Instructions  Recorded


 


Bisacodyl [Dulcolax] 5 mg PO DAILY PRN #10 tablet. 11/26/19


 


Hydrocodone/Acetaminophen [Norco 1 tab PO Q6HR PRN 3 Days #12 tab 11/26/19





5-325]  


 


Omeprazole 40 mg PO DAILY #30 cap 11/26/19


 


Ondansetron Odt [Zofran Odt] 4 mg PO Q8HR PRN #9 tab 11/26/19


 


Simethicone 40 mg/0.6 ml Drops 40 mg PO PCHS PRN #30 ml 11/26/19





[Mylicon Drops]  


 


Hyoscyamine Sulfate [Levsin] 0.125 mg PO Q4H PRN #30 tab 11/29/19


 


traMADol HCl [Ultram] 50 mg PO Q4HR PRN 3 Days #18 tab 11/29/19











                                    Allergies











Allergy/AdvReac Type Severity Reaction Status Date / Time


 


No Known Allergies Allergy   Verified 12/03/19 14:50














Review of Systems


ROS Statement: 


Those systems with pertinent positive or pertinent negative responses have been 

documented in the HPI.





ROS Other: All systems not noted in ROS Statement are negative.


Constitutional: Denies: fever, chills


Respiratory: Denies: cough, dyspnea


Cardiovascular: Denies: chest pain, palpitations, edema, syncope


Gastrointestinal: Reports: abdominal pain, nausea.  Denies: vomiting, diarrhea


Genitourinary: Denies: dysuria, hematuria


Musculoskeletal: Denies: back pain


Skin: Denies: rash


Neurological: Denies: headache, weakness, numbness





Past Medical History


Past Medical History: No Reported History


Additional Past Medical History / Comment(s): past hx of occasional migraines, 

palpitations,


History of Any Multi-Drug Resistant Organisms: None Reported


Past Surgical History: Bariatric Surgery


Additional Past Surgical History / Comment(s): oral surgery, EGD, gastric sleeve


Past Anesthesia/Blood Transfusion Reactions: No Reported Reaction


Past Psychological History: Anxiety, Depression


Smoking Status: Former smoker


Past Alcohol Use History: None Reported


Past Drug Use History: None Reported





- Past Family History


  ** Mother


Family Medical History: No Reported History





General Exam


Limitations: no limitations


General appearance: alert, in no apparent distress


Head exam: Present: atraumatic, normocephalic


Eye exam: Present: normal appearance.  Absent: scleral icterus, conjunctival 

injection


ENT exam: Present: normal oropharynx


Neck exam: Present: normal inspection


Respiratory exam: Present: normal lung sounds bilaterally.  Absent: respiratory 

distress, wheezes, rales, rhonchi, stridor


Cardiovascular Exam: Present: regular rate, normal rhythm, normal heart sounds. 

 Absent: systolic murmur, diastolic murmur, rubs, gallop


GI/Abdominal exam: Present: soft, normal bowel sounds, other (The patient's 

surgical incisions are clean, dry, and intact.  No abnormal warmth or 

erythema.).  Absent: distended, tenderness, guarding, rebound, rigid, mass


Extremities exam: Present: normal inspection, normal capillary refill.  Absent: 

pedal edema, calf tenderness


Back exam: Present: normal inspection.  Absent: CVA tenderness (R), CVA 

tenderness (L)


Neurological exam: Present: alert


Skin exam: Present: warm, dry, intact, normal color.  Absent: rash





Course


                                   Vital Signs











  11/28/19 11/28/19 11/28/19





  00:04 02:00 03:10


 


Temperature 98.2 F 98.8 F 97.7 F


 


Pulse Rate 60 65 


 


Pulse Rate [   64





Pulse Oximetery   





]   


 


Respiratory 20 18 15





Rate   


 


Blood Pressure 119/76 112/72 


 


Blood Pressure   118/73





[Right Arm]   


 


O2 Sat by Pulse 99 98 94 L





Oximetry   














Medical Decision Making





- Lab Data


Result diagrams: 


                                 11/28/19 01:03





                                 11/28/19 01:03


                                   Lab Results











  11/28/19 11/28/19 11/28/19 Range/Units





  01:03 01:03 01:03 


 


WBC   9.4   (3.8-10.6)  k/uL


 


RBC   4.08   (3.80-5.40)  m/uL


 


Hgb   11.7   (11.4-16.0)  gm/dL


 


Hct   34.6   (34.0-46.0)  %


 


MCV   84.9   (80.0-100.0)  fL


 


MCH   28.8   (25.0-35.0)  pg


 


MCHC   33.9   (31.0-37.0)  g/dL


 


RDW   13.9   (11.5-15.5)  %


 


Plt Count   326   (150-450)  k/uL


 


Neutrophils %   67   %


 


Lymphocytes %   26   %


 


Monocytes %   5   %


 


Eosinophils %   0   %


 


Basophils %   0   %


 


Neutrophils #   6.3   (1.3-7.7)  k/uL


 


Lymphocytes #   2.5   (1.0-4.8)  k/uL


 


Monocytes #   0.5   (0-1.0)  k/uL


 


Eosinophils #   0.0   (0-0.7)  k/uL


 


Basophils #   0.0   (0-0.2)  k/uL


 


Sodium  138    (137-145)  mmol/L


 


Potassium  4.6    (3.5-5.1)  mmol/L


 


Chloride  108 H    ()  mmol/L


 


Carbon Dioxide  21 L    (22-30)  mmol/L


 


Anion Gap  9    mmol/L


 


BUN  7    (7-17)  mg/dL


 


Creatinine  0.62    (0.52-1.04)  mg/dL


 


Est GFR (CKD-EPI)AfAm  >90    (>60 ml/min/1.73 sqM)  


 


Est GFR (CKD-EPI)NonAf  >90    (>60 ml/min/1.73 sqM)  


 


Glucose  83    (74-99)  mg/dL


 


Calcium  9.1    (8.4-10.2)  mg/dL


 


Total Bilirubin  0.9    (0.2-1.3)  mg/dL


 


AST  80 H    (14-36)  U/L


 


ALT  72 H    (9-52)  U/L


 


Alkaline Phosphatase  67    ()  U/L


 


Total Protein  7.5    (6.3-8.2)  g/dL


 


Albumin  4.3    (3.5-5.0)  g/dL


 


Amylase  38    ()  U/L


 


Lipase  208    ()  U/L


 


Urine Color     


 


Urine Appearance     (Clear)  


 


Urine pH     (5.0-8.0)  


 


Ur Specific Gravity     (1.001-1.035)  


 


Urine Protein     (Negative)  


 


Urine Glucose (UA)     (Negative)  


 


Urine Ketones     (Negative)  


 


Urine Blood     (Negative)  


 


Urine Nitrite     (Negative)  


 


Urine Bilirubin     (Negative)  


 


Urine Urobilinogen     (<2.0)  mg/dL


 


Ur Leukocyte Esterase     (Negative)  


 


Urine RBC     (0-5)  /hpf


 


Urine WBC     (0-5)  /hpf


 


Ur Squamous Epith Cells     (0-4)  /hpf


 


Urine Mucus     (None)  /hpf


 


Urine HCG, Qual    Not Detected  (Not Detectd)  














  11/28/19 Range/Units





  01:03 


 


WBC   (3.8-10.6)  k/uL


 


RBC   (3.80-5.40)  m/uL


 


Hgb   (11.4-16.0)  gm/dL


 


Hct   (34.0-46.0)  %


 


MCV   (80.0-100.0)  fL


 


MCH   (25.0-35.0)  pg


 


MCHC   (31.0-37.0)  g/dL


 


RDW   (11.5-15.5)  %


 


Plt Count   (150-450)  k/uL


 


Neutrophils %   %


 


Lymphocytes %   %


 


Monocytes %   %


 


Eosinophils %   %


 


Basophils %   %


 


Neutrophils #   (1.3-7.7)  k/uL


 


Lymphocytes #   (1.0-4.8)  k/uL


 


Monocytes #   (0-1.0)  k/uL


 


Eosinophils #   (0-0.7)  k/uL


 


Basophils #   (0-0.2)  k/uL


 


Sodium   (137-145)  mmol/L


 


Potassium   (3.5-5.1)  mmol/L


 


Chloride   ()  mmol/L


 


Carbon Dioxide   (22-30)  mmol/L


 


Anion Gap   mmol/L


 


BUN   (7-17)  mg/dL


 


Creatinine   (0.52-1.04)  mg/dL


 


Est GFR (CKD-EPI)AfAm   (>60 ml/min/1.73 sqM)  


 


Est GFR (CKD-EPI)NonAf   (>60 ml/min/1.73 sqM)  


 


Glucose   (74-99)  mg/dL


 


Calcium   (8.4-10.2)  mg/dL


 


Total Bilirubin   (0.2-1.3)  mg/dL


 


AST   (14-36)  U/L


 


ALT   (9-52)  U/L


 


Alkaline Phosphatase   ()  U/L


 


Total Protein   (6.3-8.2)  g/dL


 


Albumin   (3.5-5.0)  g/dL


 


Amylase   ()  U/L


 


Lipase   ()  U/L


 


Urine Color  Yellow  


 


Urine Appearance  Cloudy H  (Clear)  


 


Urine pH  6.0  (5.0-8.0)  


 


Ur Specific Gravity  1.022  (1.001-1.035)  


 


Urine Protein  Trace H  (Negative)  


 


Urine Glucose (UA)  Negative  (Negative)  


 


Urine Ketones  2+ H  (Negative)  


 


Urine Blood  Negative  (Negative)  


 


Urine Nitrite  Negative  (Negative)  


 


Urine Bilirubin  Negative  (Negative)  


 


Urine Urobilinogen  2.0  (<2.0)  mg/dL


 


Ur Leukocyte Esterase  Large H  (Negative)  


 


Urine RBC  3  (0-5)  /hpf


 


Urine WBC  12 H  (0-5)  /hpf


 


Ur Squamous Epith Cells  11 H  (0-4)  /hpf


 


Urine Mucus  Many H  (None)  /hpf


 


Urine HCG, Qual   (Not Detectd)  














Disposition


Clinical Impression: 


 Abdominal pain





Disposition: ADMITTED AS IP TO THIS [facility]


Condition: Stable

## 2019-11-28 NOTE — P.GSHP
History of Present Illness


H&P Date: 11/28/19


Chief Complaint: Abdominal pain





Patient was seen yesterday and rounds.  She was advised to spend one more night 

however in the afternoon she was doing better.  Her pain was improved and she 

was tolerating her liquids with better volume.  She was quite anxious to go home

yesterday and I released her over the phone.  Apparently she went home continue 

to drink clear liquids then experienced gassy crampy upper mid abdominal pain.  

She came back to the hospital for evaluation.  CT abdomen showed no evidence of 

leak.  Labs are normal.  Doing better this morning.  Pain is improved.  

Continues to tolerate liquids at this time.





- Review of Systems


Comment: 





The patient denies any acute changes in vision or hearing, no dysphagia or tete

nophagia, no chest pain or shortness of breath, no dysuria or hematuria, no 

headache, no runny nose, no rectal bleeding or melena, no unexplained weight 

loss 





Past Medical History


Past Medical History: No Reported History


Additional Past Medical History / Comment(s): past hx of occasional migraines, 

palpitations,


History of Any Multi-Drug Resistant Organisms: None Reported


Past Surgical History: Bariatric Surgery


Additional Past Surgical History / Comment(s): oral surgery, EGD, gastric sleeve


Past Anesthesia/Blood Transfusion Reactions: No Reported Reaction


Past Psychological History: Anxiety, Depression


Smoking Status: Former smoker


Past Alcohol Use History: None Reported


Additional Past Alcohol Use History / Comment(s): quit smoking 12/2018, smoked 

for 6 yrs, < 1 PPD


Past Drug Use History: None Reported





- Past Family History


  ** Mother


Family Medical History: No Reported History





Medications and Allergies


                                Home Medications











 Medication  Instructions  Recorded  Confirmed  Type


 


Fluticasone Nasal [place] [Flonase 2 spray NASAL DAILY 10/23/19 11/28/19 History





Nasal Spray]    


 


Loratadine [Claritin] 10 mg PO DAILY 10/23/19 11/28/19 History


 


Sertraline [Zoloft] 50 mg PO HS 10/23/19 11/28/19 History


 


Bisacodyl [Dulcolax] 5 mg PO DAILY PRN #10 tablet. 11/26/19 11/28/19 Rx


 


Hydrocodone/Acetaminophen [Norco 1 tab PO Q6HR PRN 3 Days #12 tab 11/26/19 11/28/19 Rx





5-325]    


 


Omeprazole 40 mg PO DAILY #30 cap 11/26/19 11/28/19 Rx


 


Ondansetron Odt [Zofran Odt] 4 mg PO Q8HR PRN #9 tab 11/26/19 11/28/19 Rx


 


Simethicone 40 mg/0.6 ml Drops 40 mg PO PCHS PRN #30 ml 11/26/19 11/28/19 Rx





[Mylicon Drops]    








                                    Allergies











Allergy/AdvReac Type Severity Reaction Status Date / Time


 


No Known Allergies Allergy   Verified 11/28/19 07:20














Surgical - Exam


                                   Vital Signs











Temp Pulse Resp BP Pulse Ox


 


 98.2 F   60   20   119/76   99 


 


 11/28/19 00:04  11/28/19 00:04  11/28/19 00:04  11/28/19 00:04  11/28/19 00:04

















Physical exam:


General: Well-developed, well-nourished


HEENT: Normocephalic, sclerae nonicteric


Abdomen: Incisions clean and dry, mild tenderness upper mid abdomen, 

nondistended


Extremities: No edema


Neuro: Alert and oriented





Results





- Labs





                                 11/28/19 01:03





                                 11/28/19 01:03


                  Abnormal Lab Results - Last 24 Hours (Table)











  11/28/19 11/28/19 Range/Units





  01:03 01:03 


 


Chloride  108 H   ()  mmol/L


 


Carbon Dioxide  21 L   (22-30)  mmol/L


 


AST  80 H   (14-36)  U/L


 


ALT  72 H   (9-52)  U/L


 


Urine Appearance   Cloudy H  (Clear)  


 


Urine Protein   Trace H  (Negative)  


 


Urine Ketones   2+ H  (Negative)  


 


Ur Leukocyte Esterase   Large H  (Negative)  


 


Urine WBC   12 H  (0-5)  /hpf


 


Ur Squamous Epith Cells   11 H  (0-4)  /hpf


 


Urine Mucus   Many H  (None)  /hpf








                      Microbiology - Last 24 Hours (Table)











 11/28/19 01:03 Urine Culture - Preliminary





 Urine,Voided 








                                 Diabetes panel











  11/28/19 Range/Units





  01:03 


 


Sodium  138  (137-145)  mmol/L


 


Potassium  4.6  (3.5-5.1)  mmol/L


 


Chloride  108 H  ()  mmol/L


 


Carbon Dioxide  21 L  (22-30)  mmol/L


 


BUN  7  (7-17)  mg/dL


 


Creatinine  0.62  (0.52-1.04)  mg/dL


 


Glucose  83  (74-99)  mg/dL


 


Calcium  9.1  (8.4-10.2)  mg/dL


 


AST  80 H  (14-36)  U/L


 


ALT  72 H  (9-52)  U/L


 


Alkaline Phosphatase  67  ()  U/L


 


Total Protein  7.5  (6.3-8.2)  g/dL


 


Albumin  4.3  (3.5-5.0)  g/dL








                                  Calcium panel











  11/28/19 Range/Units





  01:03 


 


Calcium  9.1  (8.4-10.2)  mg/dL


 


Albumin  4.3  (3.5-5.0)  g/dL








                                 Pituitary panel











  11/28/19 Range/Units





  01:03 


 


Sodium  138  (137-145)  mmol/L


 


Potassium  4.6  (3.5-5.1)  mmol/L


 


Chloride  108 H  ()  mmol/L


 


Carbon Dioxide  21 L  (22-30)  mmol/L


 


BUN  7  (7-17)  mg/dL


 


Creatinine  0.62  (0.52-1.04)  mg/dL


 


Glucose  83  (74-99)  mg/dL


 


Calcium  9.1  (8.4-10.2)  mg/dL








                                  Adrenal panel











  11/28/19 Range/Units





  01:03 


 


Sodium  138  (137-145)  mmol/L


 


Potassium  4.6  (3.5-5.1)  mmol/L


 


Chloride  108 H  ()  mmol/L


 


Carbon Dioxide  21 L  (22-30)  mmol/L


 


BUN  7  (7-17)  mg/dL


 


Creatinine  0.62  (0.52-1.04)  mg/dL


 


Glucose  83  (74-99)  mg/dL


 


Calcium  9.1  (8.4-10.2)  mg/dL


 


Total Bilirubin  0.9  (0.2-1.3)  mg/dL


 


AST  80 H  (14-36)  U/L


 


ALT  72 H  (9-52)  U/L


 


Alkaline Phosphatase  67  ()  U/L


 


Total Protein  7.5  (6.3-8.2)  g/dL


 


Albumin  4.3  (3.5-5.0)  g/dL














Assessment and Plan


(1) Abdominal pain


Narrative/Plan: 


Patient readmitted with abdominal pain.  He is be doing well at this time.  All 

things considered Will plan observation overnight.  Continue bariatric clears.


Current Visit: Yes   Status: Acute   Code(s): R10.9 - UNSPECIFIED ABDOMINAL PAIN

  SNOMED Code(s): 43714259

## 2019-11-28 NOTE — CT
EXAMINATION TYPE: CT abdomen pelvis wo con

 

DATE OF EXAM: 11/28/2019

 

COMPARISON: None

 

HISTORY: Patient presents with post op abdominal pain.

 

CT DLP: 1382 mGycm

Automated exposure control for dose reduction was used.

 

TECHNIQUE:  Helical acquisition of images was performed from the lung bases through the pelvis.

 

FINDINGS: 

 

There is some linear density at the lung bases related to patchy atelectasis. Heart size is normal. T
here is minimal pleural fluid. There is no pericardial effusion.

 

Liver spleen pancreas gallbladder appear normal. Bile ducts are not dilated. There is bariatric gastr
ic surgery noted.

 

There is no adrenal mass. Kidneys have normal size. There is no hydronephrosis. There is no retroperi
toneal adenopathy. Ureters are not dilated. Bladder distends smoothly. There is no inguinal hernia. U
terus is anteverted. The cul-de-sac is clear fluid. There is contrast in the appendix which appears n
ormal.

 

There is no ascites. There is a mild pneumoperitoneum consistent with recent surgery.

 

Lumbar vertebra have normal spacing and alignment. Posterior elements are intact. Bony pelvis is inta
ct.

IMPRESSION: 

POSTSURGICAL CHANGES WITH SMALL PNEUMOPERITONEUM.

 

SMALL PLEURAL EFFUSIONS AND BASILAR PATCHY ATELECTASIS. NORMAL APPENDIX. NO ORAL CONTRAST EXTRAVASATI
ON SEEN. NO EVIDENCE OF AN ABSCESS.

## 2019-11-29 VITALS
DIASTOLIC BLOOD PRESSURE: 70 MMHG | HEART RATE: 57 BPM | TEMPERATURE: 98.1 F | SYSTOLIC BLOOD PRESSURE: 104 MMHG | RESPIRATION RATE: 18 BRPM

## 2019-11-29 RX ADMIN — METOCLOPRAMIDE PRN MG: 5 INJECTION, SOLUTION INTRAMUSCULAR; INTRAVENOUS at 00:55

## 2019-11-29 RX ADMIN — FLUTICASONE PROPIONATE SCH SPRAY: 50 SPRAY, METERED NASAL at 08:21

## 2019-11-29 RX ADMIN — PANTOPRAZOLE SODIUM SCH MG: 40 TABLET, DELAYED RELEASE ORAL at 08:20

## 2019-11-29 RX ADMIN — SIMETHICONE PRN MG: 20 SUSPENSION/ DROPS ORAL at 00:57

## 2019-11-29 RX ADMIN — SIMETHICONE PRN MG: 20 SUSPENSION/ DROPS ORAL at 00:56

## 2019-11-29 RX ADMIN — LORATADINE SCH MG: 10 TABLET ORAL at 08:20

## 2019-11-29 RX ADMIN — SIMETHICONE PRN MG: 20 SUSPENSION/ DROPS ORAL at 08:25

## 2019-11-29 RX ADMIN — HYOSCYAMINE SULFATE PRN MG: 0.12 SOLUTION/ DROPS ORAL at 08:20

## 2019-11-29 RX ADMIN — KETOROLAC TROMETHAMINE PRN MG: 30 INJECTION, SOLUTION INTRAMUSCULAR at 00:55

## 2019-11-29 RX ADMIN — FAMOTIDINE SCH MG: 20 TABLET, FILM COATED ORAL at 08:20

## 2019-11-29 RX ADMIN — CEFAZOLIN SCH: 330 INJECTION, POWDER, FOR SOLUTION INTRAMUSCULAR; INTRAVENOUS at 09:15

## 2019-11-29 NOTE — P.DS
<MauraSravani PALUMBO - Last Filed: 11/29/19 12:11>





Providers


Expected date of discharge: 11/29/19


Hospital Course: 





29 year old female who recently underwent laparoscopic sleeve gastrectomy on 

11/25/2019.  Patient was discharged home in stable condition on 11/27/2019.  She

presented back to the emergency room on 11/28/2019 due to pain. CT abdomen 

pelvis was completed and negative for acute process. She has been tolerating 

liquid diet. Her pain is now controlled on oral medications. She is stable for 

discharge home today. Please see EMR for further hospital course details. 





Discharge Diagnosis: 


1.  Postoperative pain


2.  Recent sleeve gastrectomy





Nurse practitioner note has been reviewed by physician. Signing provider agrees 

with the documented findings, assessment, and plan of care. 





Patient Condition at Discharge: Stable





Plan - Discharge Summary


Discharge Rx Participant: No


New Discharge Prescriptions: 


New


   RX: Hyoscyamine Sulfate [Levsin] 0.125 mg PO Q4H PRN #30 tab


     PRN Reason: gas


   traMADol HCl [Ultram] 50 mg PO Q4HR PRN 3 Days #18 tab


     PRN Reason: Pain





No Action


   RX: Sertraline [Zoloft] 50 mg PO HS


   RX: Fluticasone Nasal Spray [Flonase Nasal Spray] 2 spray NASAL DAILY


   RX: Loratadine [Claritin] 10 mg PO DAILY


   Bisacodyl [Dulcolax] 5 mg PO DAILY PRN #10 tablet.dr


     PRN Reason: Constipation


   RX: Simethicone 40 mg/0.6 ml Drops [Mylicon Drops] 40 mg PO [facility]CHS PRN #30 ml


     PRN Reason: gas


   Hydrocodone/Acetaminophen [Norco 5-325] 1 tab PO Q6HR PRN 3 Days #12 tab


     PRN Reason: Pain


   RX: Omeprazole 40 mg PO DAILY #30 cap


   Ondansetron Odt [Zofran Odt] 4 mg PO Q8HR PRN #9 tab


     PRN Reason: Nausea


Discharge Medication List





RX: Fluticasone Nasal Spray [Flonase Nasal Spray] 2 spray NASAL DAILY 10/23/19 

[History]


RX: Loratadine [Claritin] 10 mg PO DAILY 10/23/19 [History]


RX: Sertraline [Zoloft] 50 mg PO HS 10/23/19 [History]


Bisacodyl [Dulcolax] 5 mg PO DAILY PRN #10 tablet. 11/26/19 [Rx]


Hydrocodone/Acetaminophen [Norco 5-325] 1 tab PO Q6HR PRN 3 Days #12 tab 

11/26/19 [Rx]


Ondansetron Odt [Zofran Odt] 4 mg PO Q8HR PRN #9 tab 11/26/19 [Rx]


RX: Omeprazole 40 mg PO DAILY #30 cap 11/26/19 [Rx]


RX: Simethicone 40 mg/0.6 ml Drops [Mylicon Drops] 40 mg PO PCHS PRN #30 ml 

11/26/19 [Rx]


RX: Hyoscyamine Sulfate [Levsin] 0.125 mg PO Q4H PRN #30 tab 11/29/19 [Rx]


traMADol HCl [Ultram] 50 mg PO Q4HR PRN 3 Days #18 tab 11/29/19 [Rx]








Follow up Appointment(s)/Referral(s): 


Christy Comer MD [Primary Care Provider] - 1-2 days (Office closed at time of

discharge please call Monday December 2nd to set up a follow up appointment)


[facility] [place], [place] [NON-STAFF] - 1 Week (Office closed at time of 

discharge please call Monday December 2nd to set up a follow up appointment)


Patient Instructions/Handouts:  Nutrition after Bariatric Surgery (DC), 

Laparoscopic Sleeve Gastrectomy (DC)





<Sumanth Dior - Last Filed: 11/29/19 13:07>





Providers


Date of admission: 


11/28/19 02:25





Attending physician: 


Sumanth Dior





Primary care physician: 


Christy Comer








- Discharge Diagnosis(es)


(1) Abdominal pain


Current Visit: Yes   Status: Acute

## 2019-11-29 NOTE — P.PN
<MauraSravani LINWOOD - Last Filed: 11/29/19 09:38>





Subjective


Progress Note Date: 11/29/19





CHIEF COMPLAINT: Postoperative pain





HISTORY OF PRESENT ILLNESS: Patient seen and examined at the bedside this 

morning.  She reports her pain has significantly improved.  She is tolerating 

liquid diet.  Passing flatus.  Denies nausea or vomiting.  Vital signs have been

stable





PHYSICAL EXAM: 


VITAL SIGNS: Reviewed.


GENERAL: Well-developed in no acute distress. 


HEENT:  No sclera icterus. Extraocular movements grossly intact.  Moist buccal 

mucosa. Head is atraumatic, normocephalic. 


ABDOMEN:  Soft.  Nondistended. Nontender.  Laparoscopic surgical sites clean dry

and intact without drainage.


NEUROLOGIC: Alert and oriented. Cranial nerves II through XII grossly intact.





ASSESSMENT: 


1.  Postoperative pain


2.  Recent sleeve gastrectomy





PLAN: 


Continue liquid diet


Pain control


Rx for Hyoscyamine and Ultram sent to patient's pharmacy


Anticipate discharge home this afternoon





Nurse practitioner note has been reviewed by physician. Signing provider agrees 

with the documented findings, assessment, and plan of care. 








Objective





- Vital Signs


Vital signs: 


                                   Vital Signs











Temp  98.1 F   11/29/19 07:11


 


Pulse  57 L  11/29/19 07:11


 


Resp  18   11/29/19 07:11


 


BP  104/70   11/29/19 07:11


 


Pulse Ox  97   11/29/19 07:11








                                 Intake & Output











 11/28/19 11/29/19 11/29/19





 18:59 06:59 18:59


 


Intake Total 1000 240 300


 


Balance 1000 240 300


 


Intake:   


 


  IV 1000  


 


    Sodium Chloride 0.9% 1, 1000  





    000 ml @ 125 mls/hr IV .   





    Q8H [facility] Rx#:[id number]   


 


  Oral  240 300


 


Other:   


 


  Voiding Method Toilet  


 


  # Voids  3 


 


  # Bowel Movements 1  














- Labs


CBC & Chem 7: 


                                 11/28/19 01:03





                                 11/28/19 01:03


Labs: 


                      Microbiology - Last 24 Hours (Table)











 11/28/19 01:03 Urine Culture - Preliminary





 Urine,Voided 














<Sumanth Dior - Last Filed: 11/29/19 13:08>





Subjective


As above.  Patient states her pain is improved.  She would like to go home 

today.  Likely discharge.








Objective





- Vital Signs


Vital signs: 


                                   Vital Signs











Temp  98.1 F   11/29/19 07:11


 


Pulse  57 L  11/29/19 07:11


 


Resp  18   11/29/19 07:11


 


BP  104/70   11/29/19 07:11


 


Pulse Ox  97   11/29/19 07:11








                                 Intake & Output











 11/28/19 11/29/19 11/29/19





 18:59 06:59 18:59


 


Intake Total 1000 240 300


 


Balance 1000 240 300


 


Intake:   


 


  IV 1000  


 


    Sodium Chloride 0.9% 1, 1000  





    000 ml @ 125 mls/hr IV .   





    Q8H [facility] Rx#:[id number]   


 


  Oral  240 300


 


Other:   


 


  Voiding Method Toilet  


 


  # Voids  3 


 


  # Bowel Movements 1  














- Labs


CBC & Chem 7: 


                                 11/28/19 01:03





                                 11/28/19 01:03


Labs: 


                      Microbiology - Last 24 Hours (Table)











 11/28/19 01:03 Urine Culture - Preliminary





 Urine,Voided 














Assessment and Plan


(1) Abdominal pain


Current Visit: Yes   Status: Acute   Code(s): R10.9 - UNSPECIFIED ABDOMINAL PAIN

  SNOMED Code(s): 11089193

## 2019-12-03 ENCOUNTER — HOSPITAL ENCOUNTER (OUTPATIENT)
Dept: HOSPITAL 47 - BARWHC3 | Age: 29
Discharge: HOME | End: 2019-12-03
Attending: SURGERY
Payer: COMMERCIAL

## 2019-12-03 VITALS
SYSTOLIC BLOOD PRESSURE: 111 MMHG | DIASTOLIC BLOOD PRESSURE: 75 MMHG | RESPIRATION RATE: 16 BRPM | TEMPERATURE: 98.2 F | HEART RATE: 85 BPM

## 2019-12-03 VITALS — BODY MASS INDEX: 34.9 KG/M2

## 2019-12-03 DIAGNOSIS — E66.01: Primary | ICD-10-CM

## 2019-12-03 PROCEDURE — 97802 MEDICAL NUTRITION INDIV IN: CPT

## 2019-12-03 PROCEDURE — 99211 OFF/OP EST MAY X REQ PHY/QHP: CPT

## 2019-12-03 NOTE — P.BASOAP
Subjective


Progress Note Date: 12/03/19


Principal diagnosis: 





Morbid obesity





Patient returns for 1 week postop visit.  Patient went home only to return later

that night with complaints of nausea and pain.  CAT scan was normal.  Doing 

better at this time.  Minimal nausea.  No vomiting.  Denies heartburn.  

Tolerating 45-60 ounces of liquids per day.  She states she is doing well with 

her protein intake approaching 80 g.  Weight 252 down to 237.  Heart rate 

normal.  No fevers.





Objective





- Vital Signs


Vital signs: 


                                   Vital Signs











Temp  98.2 F   12/03/19 14:08


 


Pulse  85   12/03/19 14:08


 


Resp  16   12/03/19 14:08


 


BP  111/75   12/03/19 14:08


 


Pulse Ox      








                                 Intake & Output











 12/02/19 12/03/19 12/03/19





 18:59 06:59 18:59


 


Weight   107.501 kg














- Exam





Abdomen: Soft, nondistended, incisions clean and dry, minimal tenderness





Assessment/Plan


(1) Morbid obesity


Narrative/Plan: 


Patient doing well overall.  Continue dietary and exercise regimen.  Follow-up 2

-3 weeks for reevaluation.  We'll check one month labs at that time.  Dietary to

see patient today.





Plan: 


Date: 12/03/19





Initial Weight: 106.282 kg





Initial BMI: 34.6





Current Weight: 107.501 kg





Current BMI: 34.9





Type of Surgery: 





Total Volume in Band: 





Previous Volume: 





Volume Removed: 





Volume Added: 





Band Size:

## 2019-12-17 ENCOUNTER — HOSPITAL ENCOUNTER (OUTPATIENT)
Dept: HOSPITAL 47 - BARWHC3 | Age: 29
Discharge: HOME | End: 2019-12-17
Attending: SURGERY
Payer: COMMERCIAL

## 2019-12-17 VITALS — BODY MASS INDEX: 34.5 KG/M2

## 2019-12-17 VITALS
HEART RATE: 85 BPM | SYSTOLIC BLOOD PRESSURE: 114 MMHG | TEMPERATURE: 97.9 F | RESPIRATION RATE: 16 BRPM | DIASTOLIC BLOOD PRESSURE: 79 MMHG

## 2019-12-17 DIAGNOSIS — E66.01: Primary | ICD-10-CM

## 2019-12-17 PROCEDURE — 97803 MED NUTRITION INDIV SUBSEQ: CPT

## 2019-12-17 PROCEDURE — 99211 OFF/OP EST MAY X REQ PHY/QHP: CPT

## 2019-12-17 NOTE — P.BASOAP
Subjective


Progress Note Date: 12/17/19


Principal diagnosis: 





Morbid obesity





Patient here today for reevaluation.  Underwent surgery late November.  

Occasional episodes of stabbing left upper quadrant pain.  No fevers.  No 

tachycardia.  She did have a postop CT scan that was re: normal.  This was done 

the week of surgery.  Marginal but adequate protein and fluid intake.  3 pound 

weight loss since last visit.  No GERD symptoms.  No vomiting.





Objective





- Vital Signs


Vital signs: 


                                   Vital Signs











Temp  97.9 F   12/17/19 15:45


 


Pulse  85   12/17/19 15:45


 


Resp  16   12/17/19 15:45


 


BP  114/79   12/17/19 15:45


 


Pulse Ox      








                                 Intake & Output











 12/17/19 12/17/19 12/18/19





 06:59 18:59 06:59


 


Weight  106.141 kg 














- Exam





Abdomen: Soft, nondistended, incisions clean and dry, minimal tenderness





Assessment/Plan


(1) Morbid obesity


Narrative/Plan: 


Patient doing well at this time.  Patient will monitor her complaints of 

discomfort.  She states they are improving.  We'll order one month labs to be 

drawn next week.  Follow-up 3-4 weeks.





Plan: 


Date: 12/17/19





Initial Weight: 106.282 kg





Initial BMI: 34.6





Current Weight: 106.141 kg





Current BMI: 34.5





Type of Surgery: 





Total Volume in Band: 





Previous Volume: 





Volume Removed: 





Volume Added: 





Band Size:

## 2020-01-15 ENCOUNTER — HOSPITAL ENCOUNTER (OUTPATIENT)
Dept: HOSPITAL 47 - LABWHC1 | Age: 30
Discharge: HOME | End: 2020-01-15
Attending: SURGERY
Payer: COMMERCIAL

## 2020-01-15 DIAGNOSIS — K90.89: Primary | ICD-10-CM

## 2020-01-15 DIAGNOSIS — E66.01: ICD-10-CM

## 2020-01-15 DIAGNOSIS — E55.9: ICD-10-CM

## 2020-01-15 LAB
ERYTHROCYTE [DISTWIDTH] IN BLOOD BY AUTOMATED COUNT: 4.5 M/UL (ref 3.8–5.4)
ERYTHROCYTE [DISTWIDTH] IN BLOOD: 13.8 % (ref 11.5–15.5)
HCT VFR BLD AUTO: 38.8 % (ref 34–46)
HGB BLD-MCNC: 13.1 GM/DL (ref 11.4–16)
MCH RBC QN AUTO: 29.1 PG (ref 25–35)
MCHC RBC AUTO-ENTMCNC: 33.7 G/DL (ref 31–37)
MCV RBC AUTO: 86.4 FL (ref 80–100)
PLATELET # BLD AUTO: 257 K/UL (ref 150–450)
WBC # BLD AUTO: 5 K/UL (ref 3.8–10.6)

## 2020-01-15 PROCEDURE — 83540 ASSAY OF IRON: CPT

## 2020-01-15 PROCEDURE — 83550 IRON BINDING TEST: CPT

## 2020-01-15 PROCEDURE — 82746 ASSAY OF FOLIC ACID SERUM: CPT

## 2020-01-15 PROCEDURE — 82306 VITAMIN D 25 HYDROXY: CPT

## 2020-01-15 PROCEDURE — 82607 VITAMIN B-12: CPT

## 2020-01-15 PROCEDURE — 85027 COMPLETE CBC AUTOMATED: CPT

## 2020-01-15 PROCEDURE — 84425 ASSAY OF VITAMIN B-1: CPT

## 2020-01-15 PROCEDURE — 80053 COMPREHEN METABOLIC PANEL: CPT

## 2020-01-15 PROCEDURE — 36415 COLL VENOUS BLD VENIPUNCTURE: CPT

## 2020-01-16 LAB
ALBUMIN SERPL-MCNC: 4.4 G/DL (ref 3.8–4.9)
ALBUMIN/GLOB SERPL: 2.2 G/DL (ref 1.6–3.17)
ALP SERPL-CCNC: 98 U/L (ref 41–126)
ALT SERPL-CCNC: 38 U/L (ref 8–44)
ANION GAP SERPL CALC-SCNC: 9.5 MMOL/L (ref 4–12)
AST SERPL-CCNC: 35 U/L (ref 13–35)
BUN SERPL-SCNC: 10 MG/DL (ref 9–27)
BUN/CREAT SERPL: 12.5 RATIO (ref 12–20)
CALCIUM SPEC-MCNC: 9.2 MG/DL (ref 8.7–10.3)
CHLORIDE SERPL-SCNC: 106 MMOL/L (ref 96–109)
CO2 SERPL-SCNC: 23.5 MMOL/L (ref 21.6–31.8)
GLOBULIN SER CALC-MCNC: 2 G/DL (ref 1.6–3.3)
GLUCOSE SERPL-MCNC: 84 MG/DL (ref 70–110)
IRON SERPL-MCNC: 44 UG/DL (ref 50–170)
POTASSIUM SERPL-SCNC: 3.7 MMOL/L (ref 3.5–5.5)
PROT SERPL-MCNC: 6.4 G/DL (ref 6.2–8.2)
SODIUM SERPL-SCNC: 139 MMOL/L (ref 135–145)
TIBC SERPL-MCNC: 327 UG/DL (ref 228–460)
VIT B12 SERPL-MCNC: 835 PG/ML (ref 200–944)

## 2020-01-28 ENCOUNTER — HOSPITAL ENCOUNTER (OUTPATIENT)
Dept: HOSPITAL 47 - BARWHC3 | Age: 30
Discharge: HOME | End: 2020-01-28
Attending: SURGERY
Payer: COMMERCIAL

## 2020-01-28 VITALS
TEMPERATURE: 97.8 F | SYSTOLIC BLOOD PRESSURE: 102 MMHG | RESPIRATION RATE: 16 BRPM | DIASTOLIC BLOOD PRESSURE: 70 MMHG | HEART RATE: 64 BPM

## 2020-01-28 VITALS — BODY MASS INDEX: 32.5 KG/M2

## 2020-01-28 DIAGNOSIS — E66.01: Primary | ICD-10-CM

## 2020-01-28 PROCEDURE — 99211 OFF/OP EST MAY X REQ PHY/QHP: CPT

## 2020-01-28 PROCEDURE — 97803 MED NUTRITION INDIV SUBSEQ: CPT

## 2020-01-28 NOTE — P.BASOAP
Subjective


Progress Note Date: 01/28/20


Principal diagnosis: 





Morbid obesity





Doing well since last visit.  Lost another 14lbs.  No GERD symptoms.  She is now

bleeding.  Had food stuck once.  She is due for labs.





Objective





- Vital Signs


Vital signs: 


                                   Vital Signs











Temp  97.8 F   01/28/20 16:16


 


Pulse  64   01/28/20 16:16


 


Resp  16   01/28/20 16:16


 


BP  102/70   01/28/20 16:16


 


Pulse Ox      








                                 Intake & Output











 01/28/20 01/28/20 01/29/20





 06:59 18:59 06:59


 


Weight  99.79 kg 














- Exam





Abdomen: Soft, nontender, nondistended





Assessment/Plan


(1) Morbid obesity


Narrative/Plan: 


Patient doing well at this time.  Check labs at  this time.  Continue dietary 

and exercise regimen.





Plan: 


Date: 01/28/20





Initial Weight: 106.282 kg





Initial BMI: 34.6





Current Weight: 99.79 kg





Current BMI: 32.5





Type of Surgery: 





Total Volume in Band: 





Previous Volume: 





Volume Removed: 





Volume Added: 





Band Size:

## 2020-11-03 ENCOUNTER — HOSPITAL ENCOUNTER (OUTPATIENT)
Dept: HOSPITAL 47 - BARWHC3 | Age: 30
Discharge: HOME | End: 2020-11-03
Attending: SURGERY
Payer: COMMERCIAL

## 2020-11-03 VITALS
TEMPERATURE: 97.9 F | RESPIRATION RATE: 16 BRPM | SYSTOLIC BLOOD PRESSURE: 123 MMHG | DIASTOLIC BLOOD PRESSURE: 83 MMHG | HEART RATE: 65 BPM

## 2020-11-03 VITALS — BODY MASS INDEX: 27.6 KG/M2

## 2020-11-03 DIAGNOSIS — E55.9: ICD-10-CM

## 2020-11-03 DIAGNOSIS — K90.89: ICD-10-CM

## 2020-11-03 DIAGNOSIS — E66.01: ICD-10-CM

## 2020-11-03 DIAGNOSIS — Z48.815: Primary | ICD-10-CM

## 2020-11-03 DIAGNOSIS — Z98.84: ICD-10-CM

## 2020-11-03 LAB
ERYTHROCYTE [DISTWIDTH] IN BLOOD BY AUTOMATED COUNT: 4.2 M/UL (ref 3.8–5.4)
ERYTHROCYTE [DISTWIDTH] IN BLOOD: 12.7 % (ref 11.5–15.5)
HCT VFR BLD AUTO: 37.8 % (ref 34–46)
HGB BLD-MCNC: 12.3 GM/DL (ref 11.4–16)
MCH RBC QN AUTO: 29.2 PG (ref 25–35)
MCHC RBC AUTO-ENTMCNC: 32.5 G/DL (ref 31–37)
MCV RBC AUTO: 89.9 FL (ref 80–100)
PLATELET # BLD AUTO: 263 K/UL (ref 150–450)
WBC # BLD AUTO: 6.4 K/UL (ref 3.8–10.6)

## 2020-11-03 PROCEDURE — 97803 MED NUTRITION INDIV SUBSEQ: CPT

## 2020-11-03 PROCEDURE — 83540 ASSAY OF IRON: CPT

## 2020-11-03 PROCEDURE — 82746 ASSAY OF FOLIC ACID SERUM: CPT

## 2020-11-03 PROCEDURE — 82607 VITAMIN B-12: CPT

## 2020-11-03 PROCEDURE — 99211 OFF/OP EST MAY X REQ PHY/QHP: CPT

## 2020-11-03 PROCEDURE — 80053 COMPREHEN METABOLIC PANEL: CPT

## 2020-11-03 PROCEDURE — 85027 COMPLETE CBC AUTOMATED: CPT

## 2020-11-03 PROCEDURE — 84425 ASSAY OF VITAMIN B-1: CPT

## 2020-11-03 PROCEDURE — 82306 VITAMIN D 25 HYDROXY: CPT

## 2020-11-03 NOTE — P.BASOAP
Subjective


Progress Note Date: 11/03/20


Principal diagnosis: 





Morbid obesity





Patient doing well today.  Underwent sleeve gastrectomy 1 year ago.  Last seen 

in January.  She has lost 33 pounds since last visit.  No nausea or vomiting.  

Complains of fatigue.  She stopped her multivitamin but still takes B12 vitamin 

D and biotin.  She is eating only one meal per day.  Says her multivitamins give

her an upset stomach.  No nausea or vomiting.  No reflux.





Objective





- Vital Signs


Vital signs: 


                                   Vital Signs











Temp  97.9 F   11/03/20 12:59


 


Pulse  65   11/03/20 12:59


 


Resp  16   11/03/20 12:59


 


BP  123/83   11/03/20 12:59


 


Pulse Ox      








                                 Intake & Output











 11/02/20 11/03/20 11/03/20





 18:59 06:59 18:59


 


Weight   84.822 kg














- Exam





Abdomen: Soft, nontender, nondistended





Assessment/Plan


(1) Morbid obesity


Narrative/Plan: 


Patient overall doing well.  Continue dietary and exercise regimen.  Resume 

multivitamins as tolerated.  Check annual labs at this time.  Follow-up 3-6 

months.





Plan: 


Date: 11/03/20





Initial Weight: 106.282 kg





Initial BMI: 34.6





Current Weight: 84.822 kg





Current BMI: 27.6





Type of Surgery: Vertical Sleeve Gastrectomy





Total Volume in Band: 





Previous Volume: 





Volume Removed: 





Volume Added: 





Band Size:

## 2020-11-04 LAB
ALBUMIN SERPL-MCNC: 4.3 G/DL (ref 3.8–4.9)
ALBUMIN/GLOB SERPL: 1.95 G/DL (ref 1.6–3.17)
ALP SERPL-CCNC: 77 U/L (ref 41–126)
ALT SERPL-CCNC: 12 U/L (ref 8–44)
ANION GAP SERPL CALC-SCNC: 8.2 MMOL/L (ref 4–12)
AST SERPL-CCNC: 18 U/L (ref 13–35)
BUN SERPL-SCNC: 9 MG/DL (ref 9–27)
BUN/CREAT SERPL: 12.86 RATIO (ref 12–20)
CALCIUM SPEC-MCNC: 9.1 MG/DL (ref 8.7–10.3)
CHLORIDE SERPL-SCNC: 109 MMOL/L (ref 96–109)
CO2 SERPL-SCNC: 23.8 MMOL/L (ref 21.6–31.8)
GLOBULIN SER CALC-MCNC: 2.2 G/DL (ref 1.6–3.3)
GLUCOSE SERPL-MCNC: 79 MG/DL (ref 70–110)
IRON SERPL-MCNC: 73 UG/DL (ref 50–170)
POTASSIUM SERPL-SCNC: 4.4 MMOL/L (ref 3.5–5.5)
PROT SERPL-MCNC: 6.5 G/DL (ref 6.2–8.2)
SODIUM SERPL-SCNC: 141 MMOL/L (ref 135–145)
VIT B12 SERPL-MCNC: 735 PG/ML (ref 200–944)

## 2022-12-19 ENCOUNTER — HOSPITAL ENCOUNTER (OUTPATIENT)
Dept: HOSPITAL 47 - LABWHC1 | Age: 32
Discharge: HOME | End: 2022-12-19
Attending: SURGERY
Payer: COMMERCIAL

## 2022-12-19 DIAGNOSIS — Z53.9: Primary | ICD-10-CM

## 2022-12-27 ENCOUNTER — HOSPITAL ENCOUNTER (OUTPATIENT)
Dept: HOSPITAL 47 - BARWHC3 | Age: 32
End: 2022-12-27
Attending: SURGERY
Payer: COMMERCIAL

## 2022-12-27 VITALS — DIASTOLIC BLOOD PRESSURE: 69 MMHG | HEART RATE: 82 BPM | SYSTOLIC BLOOD PRESSURE: 113 MMHG | TEMPERATURE: 98.3 F

## 2022-12-27 VITALS — BODY MASS INDEX: 27 KG/M2

## 2022-12-27 DIAGNOSIS — E66.01: Primary | ICD-10-CM

## 2022-12-27 DIAGNOSIS — Z87.891: ICD-10-CM

## 2022-12-27 PROCEDURE — 99211 OFF/OP EST MAY X REQ PHY/QHP: CPT

## 2022-12-27 NOTE — P.BASOAP
Subjective


Progress Note Date: 12/27/22


Principal diagnosis: 





Morbid obesity





Patient returns for reevaluation.  Last seen November 2020.  Patient has done 

well since that time.  Weight down 4 pounds since her last visit.  She says she 

had lost weight down into the 170s previously.  No GERD, no dysphagia.  Recent 

labs look good.  Taking a multivitamin, vitamin D, B12.





Objective





- Vital Signs


Vital signs: 


                                   Vital Signs











Temp  98.3 F   12/27/22 14:59


 


Pulse  82   12/27/22 14:59


 


Resp      


 


BP  113/69   12/27/22 14:59


 


Pulse Ox      


 


FiO2      








                                 Intake & Output











 12/26/22 12/27/22 12/27/22





 18:59 06:59 18:59


 


Weight   83.007 kg














- Exam





Abdomen: Soft, nontender, nondistended





Assessment/Plan


(1) Morbid obesity


Narrative/Plan: 


Patient doing well at this time.  Continue dietary and exercise regimen.  

Patient is now lifting weights.  Plan recheck next November.  We'll check annual

labs at that time.





Plan: 


Date: 12/27/22





Initial Weight: 106.282 kg





Initial BMI: 34.6





Current Weight: 83.007 kg





Current BMI: 27.0





Type of Surgery: 





Total Volume in Band: 





Previous Volume: 





Volume Removed: 





Volume Added: 





Band Size:

## 2024-11-05 ENCOUNTER — HOSPITAL ENCOUNTER (EMERGENCY)
Dept: HOSPITAL 47 - EC | Age: 34
Discharge: HOME | End: 2024-11-05
Payer: COMMERCIAL

## 2024-11-05 VITALS
TEMPERATURE: 98.1 F | RESPIRATION RATE: 16 BRPM | SYSTOLIC BLOOD PRESSURE: 108 MMHG | HEART RATE: 61 BPM | DIASTOLIC BLOOD PRESSURE: 68 MMHG

## 2024-11-05 DIAGNOSIS — T78.40XA: Primary | ICD-10-CM

## 2024-11-05 DIAGNOSIS — R22.0: ICD-10-CM

## 2024-11-05 DIAGNOSIS — L71.9: ICD-10-CM

## 2024-11-05 PROCEDURE — 99283 EMERGENCY DEPT VISIT LOW MDM: CPT

## 2024-11-05 PROCEDURE — 96372 THER/PROPH/DIAG INJ SC/IM: CPT

## 2024-11-05 RX ADMIN — FAMOTIDINE STA MG: 20 TABLET, FILM COATED ORAL at 06:35

## 2024-11-05 RX ADMIN — DIPHENHYDRAMINE HYDROCHLORIDE STA MG: 50 INJECTION, SOLUTION INTRAMUSCULAR; INTRAVENOUS at 06:37

## 2025-03-11 ENCOUNTER — HOSPITAL ENCOUNTER (EMERGENCY)
Dept: HOSPITAL 47 - EC | Age: 35
Discharge: HOME | End: 2025-03-11
Payer: COMMERCIAL

## 2025-03-11 VITALS
SYSTOLIC BLOOD PRESSURE: 110 MMHG | RESPIRATION RATE: 17 BRPM | TEMPERATURE: 97.9 F | HEART RATE: 72 BPM | DIASTOLIC BLOOD PRESSURE: 78 MMHG

## 2025-03-11 DIAGNOSIS — Z98.84: ICD-10-CM

## 2025-03-11 DIAGNOSIS — R10.9: ICD-10-CM

## 2025-03-11 DIAGNOSIS — K64.9: Primary | ICD-10-CM

## 2025-03-11 DIAGNOSIS — K92.2: ICD-10-CM

## 2025-03-11 LAB
ALBUMIN SERPL-MCNC: 4.9 G/DL (ref 3.5–5)
ALP SERPL-CCNC: 63 U/L (ref 38–126)
ALT SERPL-CCNC: 21 U/L (ref 4–34)
ANION GAP SERPL CALC-SCNC: 13 MMOL/L
AST SERPL-CCNC: 31 U/L (ref 14–36)
BASOPHILS # BLD AUTO: 0.1 K/UL (ref 0–0.2)
BASOPHILS NFR BLD AUTO: 1 %
BUN SERPL-SCNC: 16 MG/DL (ref 7–17)
CALCIUM SPEC-MCNC: 9.5 MG/DL (ref 8.4–10.2)
CHLORIDE SERPL-SCNC: 101 MMOL/L (ref 98–107)
CO2 SERPL-SCNC: 23 MMOL/L (ref 22–30)
EOSINOPHIL # BLD AUTO: 0.1 K/UL (ref 0–0.7)
EOSINOPHIL NFR BLD AUTO: 1 %
ERYTHROCYTE [DISTWIDTH] IN BLOOD BY AUTOMATED COUNT: 5.01 M/UL (ref 3.8–5.4)
ERYTHROCYTE [DISTWIDTH] IN BLOOD: 15.3 % (ref 11.5–15.5)
GLUCOSE SERPL-MCNC: 87 MG/DL (ref 74–99)
HCT VFR BLD AUTO: 37.8 % (ref 34–46)
HGB BLD-MCNC: 11.6 GM/DL (ref 11.4–16)
LIPASE SERPL-CCNC: 287 U/L (ref 23–300)
LYMPHOCYTES # SPEC AUTO: 2.2 K/UL (ref 1–4.8)
LYMPHOCYTES NFR SPEC AUTO: 29 %
MCH RBC QN AUTO: 23.1 PG (ref 25–35)
MCHC RBC AUTO-ENTMCNC: 30.7 G/DL (ref 31–37)
MCV RBC AUTO: 75.5 FL (ref 80–100)
MONOCYTES # BLD AUTO: 0.4 K/UL (ref 0–1)
MONOCYTES NFR BLD AUTO: 5 %
NEUTROPHILS # BLD AUTO: 4.8 K/UL (ref 1.3–7.7)
NEUTROPHILS NFR BLD AUTO: 62 %
PH UR: 5.5 [PH] (ref 5–8)
PLATELET # BLD AUTO: 309 K/UL (ref 150–450)
POTASSIUM SERPL-SCNC: 4.6 MMOL/L (ref 3.5–5.1)
PROT SERPL-MCNC: 8.1 G/DL (ref 6.3–8.2)
SODIUM SERPL-SCNC: 137 MMOL/L (ref 137–145)
SP GR UR: 1.01 (ref 1–1.03)
UROBILINOGEN UR QL STRIP: <2 MG/DL (ref ?–2)
WBC # BLD AUTO: 7.7 K/UL (ref 3.8–10.6)

## 2025-03-11 PROCEDURE — 86850 RBC ANTIBODY SCREEN: CPT

## 2025-03-11 PROCEDURE — 83605 ASSAY OF LACTIC ACID: CPT

## 2025-03-11 PROCEDURE — 86900 BLOOD TYPING SEROLOGIC ABO: CPT

## 2025-03-11 PROCEDURE — 86901 BLOOD TYPING SEROLOGIC RH(D): CPT

## 2025-03-11 PROCEDURE — 85025 COMPLETE CBC W/AUTO DIFF WBC: CPT

## 2025-03-11 PROCEDURE — 83690 ASSAY OF LIPASE: CPT

## 2025-03-11 PROCEDURE — 81003 URINALYSIS AUTO W/O SCOPE: CPT

## 2025-03-11 PROCEDURE — 93005 ELECTROCARDIOGRAM TRACING: CPT

## 2025-03-11 PROCEDURE — 82272 OCCULT BLD FECES 1-3 TESTS: CPT

## 2025-03-11 PROCEDURE — 36415 COLL VENOUS BLD VENIPUNCTURE: CPT

## 2025-03-11 PROCEDURE — 74177 CT ABD & PELVIS W/CONTRAST: CPT

## 2025-03-11 PROCEDURE — 81025 URINE PREGNANCY TEST: CPT

## 2025-03-11 PROCEDURE — 80053 COMPREHEN METABOLIC PANEL: CPT

## 2025-03-11 PROCEDURE — 99285 EMERGENCY DEPT VISIT HI MDM: CPT

## 2025-03-11 NOTE — CT
EXAMINATION TYPE: CT abdomen pelvis w con

 

DATE OF EXAM: 3/11/2025 9:16 PM

 

COMPARISON: CT abdomen pelvis most recent from  11/20/2019

 

CLINICAL INDICATION: Female, 34 years old with history of ab cramping, GI bleed, hx gastric sleeve; P
t states that she started having blood in her stool starting December 2024, until now its been approx
imately 1-2 times a week. Her doctor told her that she has anemia. Today she noticed large amounts of
 blood in her stool, she states that it looks similar to a menstrual period. She is now having abdomi
nal pain. Denies any N/V, has had diarrhea.

 

TECHNIQUE:  Axial CT abdomen pelvis w con;Sagittal and coronal reformats were created on a separate w
orkstation. 

Contrast used:100mL mL of Isovue 300 with IV Contrast, (none if empty)

Oral contrast used: without Oral Contrast (none if empty)

CT DLP: 1152.1 mGycm, Automated exposure control for dose reduction was used.

 

FINDINGS: 

LOWER CHEST: Unremarkable

 

ABDOMEN

LIVER: Unremarkable

GALLBLADDER AND BILE DUCTS: Unremarkable.

PANCREAS: Unremarkable.

SPLEEN: Unremarkable.

ADRENAL GLANDS: Unremarkable.

KIDNEYS AND URETERS: No evidence of hydronephrosis or renal calculus. The ureters are unremarkable.  


 

PELVIS

BLADDER: No evidence for wall thickening or mass given limitations of exam.

REPRODUCTIVE: Unremarkable.

 

ABDOMEN & PELVIS

STOMACH AND BOWEL: No evidence of bowel obstruction. Postsurgical changes to the stomach. Appendix is
 normal.

PERITONEUM/RETROPERITONEUM: No evidence of pneumoperitoneum or free fluid.

VASCULATURE: No evidence of aortic aneurysm. 

MUSCULOSKELETAL: No acute osseous abnormalities

LYMPH NODES: No gross evidence for lymphadenopathy.

SOFT TISSUE/ABDOMINAL WALL: Unremarkable

 

IMPRESSION:

No evidence for acute process involving the colon or bowel. There is postsurgical changes of the stom
ach. No evidence for hemorrhage on this non-GI bleed protocol exam.

 

[facility], Workstation: [id number], 3/11/2025 9:29 PM

## 2025-03-11 NOTE — ED
GI Bleed HPI





- General


Chief complaint: GI Bleed


Stated complaint: blood in stool abd cramping


Time Seen by Provider: 03/11/25 18:01


Source: patient, RN notes reviewed


Mode of arrival: ambulatory


Limitations: no limitations





- History of Present Illness


Initial comments: 


34-year-old female with history of gastric sleeve and 2021 with Dr. Dior, 

presenting to the emergency department for intermittent rectal bleeding and 

abdominal pain.  Patient states that since middle of December 2024 she has been 

experiencing intermittent bright red blood per rectum that will occur once every

week or every 2 weeks where she passes large amount of blood and occasional 

clots which she describes as 'almost like menstrual bleeding.'  Today she 

experienced 2 bouts of her blood per rectum and diarrhea with abdominal cramping

where she contacted her primary care provider with the [facility] 

Emergency Department for evaluation.  She does have a history of iron deficiency

anemia is currently on iron tablets however is unaware what her baseline 

hemoglobin is with this last being checked in December.  She denies heart 

palpitations, dizziness, lightheadedness, nausea, vomiting, fevers, urinary 

complaints.








- Related Data


                                Home Medications











 Medication  Instructions  Recorded  Confirmed


 


Sertraline [Zoloft] 50 mg PO HS 10/23/19 11/03/20











                                    Allergies











Allergy/AdvReac Type Severity Reaction Status Date / Time


 


gluten AdvReac  Rash/Hives Verified 03/11/25 18:49














Review of Systems


ROS Statement: 


Those systems with pertinent positive or pertinent negative responses have been 

documented in the HPI.





ROS Other: All systems not noted in ROS Statement are negative.





Past Medical History


Past Medical History: No Reported History


Additional Past Medical History / Comment(s): past hx of occasional migraines, 

palpitations,


History of Any Multi-Drug Resistant Organisms: None Reported


Past Surgical History: Bariatric Surgery


Additional Past Surgical History / Comment(s): oral surgery, EGD, gastric sleeve


Past Anesthesia/Blood Transfusion Reactions: No Reported Reaction


Past Psychological History: Anxiety, Depression


Smoking Status: Never smoker


Past Alcohol Use History: None Reported


Past Drug Use History: None Reported





- Past Family History


  ** Mother


Family Medical History: No Reported History





General Exam


Limitations: no limitations


Neck exam: Present: normal inspection.  Absent: tenderness, meningismus, 

lymphadenopathy


Respiratory exam: Present: normal lung sounds bilaterally.  Absent: respiratory 

distress, wheezes, rales, rhonchi, stridor


Cardiovascular Exam: Present: regular rate, normal rhythm, normal heart sounds. 

 Absent: systolic murmur, diastolic murmur, rubs, gallop, clicks


GI/Abdominal exam: Present: soft, tenderness (lower and upper abdomen to pal

pation), normal bowel sounds.  Absent: distended, guarding, rebound, rigid


Extremities exam: Present: normal inspection, full ROM, normal capillary refill.

  Absent: tenderness, pedal edema, joint swelling, calf tenderness


Back exam: Present: normal inspection





Course


                                   Vital Signs











  03/11/25





  18:43


 


Temperature 97.8 F


 


Pulse Rate 69


 


Respiratory 16





Rate 


 


Blood Pressure 113/80


 


O2 Sat by Pulse 99





Oximetry 














Medical Decision Making





- Medical Decision Making


Was pt. sent in by a medical professional or institution (, PA, NP, urgent 

care, hospital, or nursing home...) When possible be specific


@ -No


Did you speak to anyone other than the patient for history (EMS, parent, family,

 police, friend...)? What history was obtained from this source 


@ -No


Did you review nursing and triage notes (agree or disagree)? Why? 


@ -I reviewed and agree with nursing and triage notes


Were old charts reviewed (outside hosp., previous admission, EMS record, old 

EKG, old radiological studies, urgent care reports/EKG's, nursing home records)?

 Report findings 


@ -No old charts were reviewed


Differential Diagnosis (chest pain, altered mental status, abdominal pain women,

 abdominal pain men, vaginal bleeding, weakness, fever, dyspnea, syncope, headac

he, dizziness, GI bleed, back pain, seizure, CVA, palpatations, mental health, 

musculoskeletal)? 


@ -Differential GI Bleed:


Esophageal varices, aortoenteric fistula, Geeta-Fraga, gastritis, peptic ulcer

 disease, diverticulosis, inflammatory bowel disease, hemorrhoids, fissure, 

colitis, malignancy, Meckel's diverticulum, this is not meant to be an all-

inclusive list.





EKG interpreted by me (3pts min.).


@ -Completed at 1859 sinus rhythm with a ventricular rate of 63, [place] interval 

147, QRS 88, QTc 385.


X-rays interpreted by me (1pt min.).


@ -None done


CT interpreted by me (1pt min.).


@ -CT of the abdomen with IV contrast reveals no evidence for acute process 

involving the colon or bladder with postsurgical changes of the stomach and no 

evidence of hemorrhage


U/S interpreted by me (1pt. min.).


@ -None done


What testing was considered but not performed or refused? (CT, X-rays, U/S, 

labs)? Why?


@ -None


What meds were considered but not given or refused? Why?


@ -None


Did you discuss the management of the patient with other professionals 

(professionals i.e. Dr., PA, NP, lab, RT, psych nurse, , , 

teacher, , )? Give summary


@ -No


Was smoking cessation discussed for >3mins.?


@ -No


Was critical care preformed (if so, how long)?


@ -No


Were there social determinants of health that impacted care today? How? 

(Homelessness, low income, unemployed, alcoholism, drug addiction, 

transportation, low edu. Level, literacy, decrease access to med. care, [facility]DC, 

rehab)?


@ -No


Was there de-escalation of care discussed even if they declined (Discuss DNR or 

withdrawal of care, Hospice)? DNR status


@ -No


What co-morbidities impacted this encounter? (DM, HTN, Smoking, COPD, CAD, 

Cancer, CVA, ARF, Chemo, Hep., AIDS, mental health diagnosis, sleep apnea, 

morbid obesity)?


@ -None


Was patient admitted / discharged? Hospital course, mention meds given and 

route, prescriptions, significant lab abnormalities, going to OR and other 

pertinent info.


@ -Discharge.  34 year old female presents emergency department for complaint of

 rectal bleeding abdominal pain.  Patient have femoral tenderness on exam of 

upper and lower quadrant no signs of rebound tenderness or rigidity.  She was 

offered pain medication however was declined.  She will evaluate laboratory 

testing patient is CT imaging of the abdomen with concern for abdominal pain 

after complex abdominal gastric sleeve procedure.  Rectal examination completed 

with nursing staff at bedside remarkable for a external hemorrhoid.  Rectal tone

 intact.  Patient is not anemic with a hemoglobin of 11.6 and hematocrit of 

37.8.  CMP is unremarkable.  Urinalysis no signs of infection, hCG is negative. 

 Positive stool occult.  CT no acute process.  As this has been ongoing over the

 past 3-1/2 months and labs are stable patient stable for discharge with 

outpatient follow-up.  Case discussed with Dr. Hanson


Undiagnosed new problem with uncertain prognosis?


@ -No


Drug Therapy requiring intensive monitoring for toxicity (Heparin, Nitro, 

Insulin, Cardizem)?


@ -No


Were any procedures done?


@ -No


Diagnosis/symptom?


@ -GI bleed, external hemorrhoids, abdominal pain


Acute, or Chronic, or Acute on Chronic?


@ -Acute


Uncomplicated (without systemic symptoms) or Complicated (systemic symptoms)?


@ -Uncomplicated


Side effects of treatment?


@ -No


Exacerbation, Progression, or Severe Exacerbation?


@ -No


Poses a threat to life or bodily function? How? (Chest pain, USA, MI, pneumonia,

 PE, COPD, DKA, ARF, appy, cholecystitis, CVA, Diverticulitis, Homicidal, 

Suicidal, threat to staff... and all critical care pts)


@ -No








- Lab Data


Result diagrams: 


                                 03/11/25 18:52





                                 03/11/25 18:52


                                   Lab Results











  03/11/25 03/11/25 03/11/25 Range/Units





  18:47 18:52 18:52 


 


WBC   7.7   (3.8-10.6)  k/uL


 


RBC   5.01   (3.80-5.40)  m/uL


 


Hgb   11.6   (11.4-16.0)  gm/dL


 


Hct   37.8   (34.0-46.0)  %


 


MCV   75.5 L   (80.0-100.0)  fL


 


MCH   23.1 L   (25.0-35.0)  pg


 


MCHC   30.7 L   (31.0-37.0)  g/dL


 


RDW   15.3   (11.5-15.5)  %


 


Plt Count   309   (150-450)  k/uL


 


MPV   6.9   


 


Neutrophils %   62   %


 


Lymphocytes %   29   %


 


Monocytes %   5   %


 


Eosinophils %   1   %


 


Basophils %   1   %


 


Neutrophils #   4.8   (1.3-7.7)  k/uL


 


Lymphocytes #   2.2   (1.0-4.8)  k/uL


 


Monocytes #   0.4   (0-1.0)  k/uL


 


Eosinophils #   0.1   (0-0.7)  k/uL


 


Basophils #   0.1   (0-0.2)  k/uL


 


Hypochromasia   Moderate   


 


Microcytosis   Slight   


 


Sodium    137  (137-145)  mmol/L


 


Potassium    4.6  (3.5-5.1)  mmol/L


 


Chloride    101  ()  mmol/L


 


Carbon Dioxide    23  (22-30)  mmol/L


 


Anion Gap    13  mmol/L


 


BUN    16  (7-17)  mg/dL


 


Creatinine    0.68  (0.52-1.04)  mg/dL


 


Est GFR (CKD-EPI)AfAm    >90  (>60 ml/min/1.73 sqM)  


 


Est GFR (CKD-EPI)NonAf    >90  (>60 ml/min/1.73 sqM)  


 


Glucose    87  (74-99)  mg/dL


 


Plasma Lactic Acid Wade     (0.7-2.0)  mmol/L


 


Calcium    9.5  (8.4-10.2)  mg/dL


 


Total Bilirubin    0.3  (0.2-1.3)  mg/dL


 


AST    31  (14-36)  U/L


 


ALT    21  (4-34)  U/L


 


Alkaline Phosphatase    63  ()  U/L


 


Total Protein    8.1  (6.3-8.2)  g/dL


 


Albumin    4.9  (3.5-5.0)  g/dL


 


Lipase    287  ()  U/L


 


Urine Color     


 


Urine Appearance     (Clear)  


 


Urine pH     (5.0-8.0)  


 


Ur Specific Gravity     (1.001-1.035)  


 


Urine Protein     (Negative)  


 


Urine Glucose (UA)     (Negative)  


 


Urine Ketones     (Negative)  


 


Urine Blood     (Negative)  


 


Urine Nitrite     (Negative)  


 


Urine Bilirubin     (Negative)  


 


Urine Urobilinogen     (<2.0)  mg/dL


 


Ur Leukocyte Esterase     (Negative)  


 


Urine HCG, Qual     (Not Detectd)  


 


Stool Occult Blood     (Negative)  


 


Blood Type  O Positive    


 


Blood Type Confirm     


 


Blood Type Recheck  No Previous Record    


 


Bld Type Recheck Status  CABO Indicated    


 


Antibody Screen  NEGATIVE    


 


Spec Expiration Date  03/14/2025 - 2347 03/11/25 03/11/25 03/11/25 Range/Units





  18:52 18:52 20:04 


 


WBC     (3.8-10.6)  k/uL


 


RBC     (3.80-5.40)  m/uL


 


Hgb     (11.4-16.0)  gm/dL


 


Hct     (34.0-46.0)  %


 


MCV     (80.0-100.0)  fL


 


MCH     (25.0-35.0)  pg


 


MCHC     (31.0-37.0)  g/dL


 


RDW     (11.5-15.5)  %


 


Plt Count     (150-450)  k/uL


 


MPV     


 


Neutrophils %     %


 


Lymphocytes %     %


 


Monocytes %     %


 


Eosinophils %     %


 


Basophils %     %


 


Neutrophils #     (1.3-7.7)  k/uL


 


Lymphocytes #     (1.0-4.8)  k/uL


 


Monocytes #     (0-1.0)  k/uL


 


Eosinophils #     (0-0.7)  k/uL


 


Basophils #     (0-0.2)  k/uL


 


Hypochromasia     


 


Microcytosis     


 


Sodium     (137-145)  mmol/L


 


Potassium     (3.5-5.1)  mmol/L


 


Chloride     ()  mmol/L


 


Carbon Dioxide     (22-30)  mmol/L


 


Anion Gap     mmol/L


 


BUN     (7-17)  mg/dL


 


Creatinine     (0.52-1.04)  mg/dL


 


Est GFR (CKD-EPI)AfAm     (>60 ml/min/1.73 sqM)  


 


Est GFR (CKD-EPI)NonAf     (>60 ml/min/1.73 sqM)  


 


Glucose     (74-99)  mg/dL


 


Plasma Lactic Acid Wade  1.0    (0.7-2.0)  mmol/L


 


Calcium     (8.4-10.2)  mg/dL


 


Total Bilirubin     (0.2-1.3)  mg/dL


 


AST     (14-36)  U/L


 


ALT     (4-34)  U/L


 


Alkaline Phosphatase     ()  U/L


 


Total Protein     (6.3-8.2)  g/dL


 


Albumin     (3.5-5.0)  g/dL


 


Lipase     ()  U/L


 


Urine Color     


 


Urine Appearance     (Clear)  


 


Urine pH     (5.0-8.0)  


 


Ur Specific Gravity     (1.001-1.035)  


 


Urine Protein     (Negative)  


 


Urine Glucose (UA)     (Negative)  


 


Urine Ketones     (Negative)  


 


Urine Blood     (Negative)  


 


Urine Nitrite     (Negative)  


 


Urine Bilirubin     (Negative)  


 


Urine Urobilinogen     (<2.0)  mg/dL


 


Ur Leukocyte Esterase     (Negative)  


 


Urine HCG, Qual     (Not Detectd)  


 


Stool Occult Blood    Positive H  (Negative)  


 


Blood Type     


 


Blood Type Confirm   O Positive   


 


Blood Type Recheck     


 


Bld Type Recheck Status     


 


Antibody Screen     


 


Spec Expiration Date     














  03/11/25 03/11/25 Range/Units





  20:04 20:04 


 


WBC    (3.8-10.6)  k/uL


 


RBC    (3.80-5.40)  m/uL


 


Hgb    (11.4-16.0)  gm/dL


 


Hct    (34.0-46.0)  %


 


MCV    (80.0-100.0)  fL


 


MCH    (25.0-35.0)  pg


 


MCHC    (31.0-37.0)  g/dL


 


RDW    (11.5-15.5)  %


 


Plt Count    (150-450)  k/uL


 


MPV    


 


Neutrophils %    %


 


Lymphocytes %    %


 


Monocytes %    %


 


Eosinophils %    %


 


Basophils %    %


 


Neutrophils #    (1.3-7.7)  k/uL


 


Lymphocytes #    (1.0-4.8)  k/uL


 


Monocytes #    (0-1.0)  k/uL


 


Eosinophils #    (0-0.7)  k/uL


 


Basophils #    (0-0.2)  k/uL


 


Hypochromasia    


 


Microcytosis    


 


Sodium    (137-145)  mmol/L


 


Potassium    (3.5-5.1)  mmol/L


 


Chloride    ()  mmol/L


 


Carbon Dioxide    (22-30)  mmol/L


 


Anion Gap    mmol/L


 


BUN    (7-17)  mg/dL


 


Creatinine    (0.52-1.04)  mg/dL


 


Est GFR (CKD-EPI)AfAm    (>60 ml/min/1.73 sqM)  


 


Est GFR (CKD-EPI)NonAf    (>60 ml/min/1.73 sqM)  


 


Glucose    (74-99)  mg/dL


 


Plasma Lactic Acid Wade    (0.7-2.0)  mmol/L


 


Calcium    (8.4-10.2)  mg/dL


 


Total Bilirubin    (0.2-1.3)  mg/dL


 


AST    (14-36)  U/L


 


ALT    (4-34)  U/L


 


Alkaline Phosphatase    ()  U/L


 


Total Protein    (6.3-8.2)  g/dL


 


Albumin    (3.5-5.0)  g/dL


 


Lipase    ()  U/L


 


Urine Color  Colorless   


 


Urine Appearance  Clear   (Clear)  


 


Urine pH  5.5   (5.0-8.0)  


 


Ur Specific Gravity  1.012   (1.001-1.035)  


 


Urine Protein  Negative   (Negative)  


 


Urine Glucose (UA)  Negative   (Negative)  


 


Urine Ketones  Negative   (Negative)  


 


Urine Blood  Negative   (Negative)  


 


Urine Nitrite  Negative   (Negative)  


 


Urine Bilirubin  Negative   (Negative)  


 


Urine Urobilinogen  <2.0   (<2.0)  mg/dL


 


Ur Leukocyte Esterase  Negative   (Negative)  


 


Urine HCG, Qual   Not Detected  (Not Detectd)  


 


Stool Occult Blood    (Negative)  


 


Blood Type    


 


Blood Type Confirm    


 


Blood Type Recheck    


 


Bld Type Recheck Status    


 


Antibody Screen    


 


Spec Expiration Date    














Disposition


Clinical Impression: 


 GI bleed, Hemorrhoid, Abdominal pain





Disposition: HOME SELF-CARE


Condition: Good


Instructions (If sedation given, give patient instructions):  Gastrointestinal 

Bleeding (ED)


Additional Instructions: 


Please return to the Emergency Department if symptoms worsen or any other 

concerns.


Is patient prescribed a controlled substance at d/c from ED?: No


Referrals: 


Christy Comer MD [Primary Care Provider] - 1-2 days


Time of Disposition: 22:06

## 2025-07-29 ENCOUNTER — HOSPITAL ENCOUNTER (EMERGENCY)
Dept: HOSPITAL 47 - EC | Age: 35
Discharge: HOME | End: 2025-07-29
Payer: COMMERCIAL

## 2025-07-29 VITALS — TEMPERATURE: 97.8 F | DIASTOLIC BLOOD PRESSURE: 78 MMHG | HEART RATE: 68 BPM | SYSTOLIC BLOOD PRESSURE: 124 MMHG

## 2025-07-29 VITALS — RESPIRATION RATE: 18 BRPM

## 2025-07-29 DIAGNOSIS — R00.2: Primary | ICD-10-CM

## 2025-07-29 LAB
ALBUMIN SERPL-MCNC: 4.5 G/DL (ref 3.5–5)
ALP SERPL-CCNC: 57 U/L (ref 38–126)
ALT SERPL-CCNC: 18 U/L (ref 4–34)
ANION GAP SERPL CALC-SCNC: 12 MMOL/L
ANISOCYTOSIS BLD QL SMEAR: (no result)
APTT BLD: 24.9 SEC (ref 22–30)
AST SERPL-CCNC: 28 U/L (ref 14–36)
BASO STIPL BLD QL SMEAR: (no result)
BASOPHILS # BLD AUTO: 0.04 10*3/UL (ref 0–0.1)
BASOPHILS NFR BLD AUTO: 0.6 %
BILIRUB BLD-MCNC: 0.4 MG/DL (ref 0.2–1.3)
BUN SERPL-SCNC: 14 MG/DL (ref 7–17)
BURR CELLS BLD QL SMEAR: (no result)
CALCIUM SPEC-MCNC: 9.4 MG/DL (ref 8.4–10.2)
CHLORIDE SERPL-SCNC: 103 MMOL/L (ref 98–107)
CO2 SERPL-SCNC: 22 MMOL/L (ref 22–30)
CREATININE: 0.71 MG/DL (ref 0.52–1.04)
DACRYOCYTES BLD QL SMEAR: (no result)
DOHLE BOD BLD QL SMEAR: (no result)
EOSINOPHIL # BLD AUTO: 0.05 10*3/UL (ref 0.04–0.35)
EOSINOPHIL NFR BLD AUTO: 0.7 %
ERYTHROCYTE [DISTWIDTH] IN BLOOD BY AUTOMATED COUNT: 4.81 10*6/UL (ref 4.1–5.2)
ERYTHROCYTE [DISTWIDTH] IN BLOOD: 17.6 % (ref 11.5–14.5)
GLUCOSE SERPL-MCNC: 78 MG/DL (ref 74–99)
HCT VFR BLD AUTO: 38.4 % (ref 37.2–46.3)
HGB BLD-MCNC: 12.8 G/DL (ref 12–15)
HOWELL-JOLLY BOD BLD QL SMEAR: (no result)
HYPOCHROMIA BLD QL SMEAR: (no result)
IMM GRANULOCYTES # BLD: 0.02 10*3/UL (ref 0–0.04)
INR PPP: 1 (ref ?–1.2)
LG PLATELETS BLD QL SMEAR: (no result)
LYMPHOCYTES # SPEC AUTO: 2.24 10*3/UL (ref 0.9–5)
LYMPHOCYTES NFR SPEC AUTO: 30.9 %
Lab: (no result)
MAGNESIUM SPEC-SCNC: 2 MG/DL (ref 1.6–2.3)
MCH RBC QN AUTO: 26.6 PG (ref 27–32)
MCHC RBC AUTO-ENTMCNC: 33.3 G/DL (ref 32–37)
MCV RBC AUTO: 79.8 FL (ref 80–97)
MONOCYTES # BLD AUTO: 0.35 10*3/UL (ref 0.2–1)
MONOCYTES NFR BLD AUTO: 4.8 %
NEUTROPHILS # BLD AUTO: 4.56 10*3/UL (ref 1.8–7.7)
NEUTROPHILS NFR BLD AUTO: 62.7 %
NEUTS HYPERSEG # BLD: (no result) 10*3/UL
NRBC BLD AUTO-RTO: (no result) %
OVALOCYTES BLD QL SMEAR: (no result)
PELGER HUET CELLS BLD QL SMEAR: (no result)
PLATELET # BLD AUTO: 296 10*3/UL (ref 140–440)
PLATELETS.RETICULATED NFR BLD AUTO: (no result) %
PMV BLD AUTO: 9 FL (ref 9.5–12.2)
POIKILOCYTOSIS BLD QL SMEAR: (no result)
POIKILOCYTOSIS BLD QL SMEAR: (no result)
POLYCHROMASIA BLD QL SMEAR: (no result)
POTASSIUM SERPL-SCNC: 4.3 MMOL/L (ref 3.5–5.1)
PROT SERPL-MCNC: 7.5 G/DL (ref 6.3–8.2)
PT BLD: 11.2 SEC (ref 10–12.5)
RBC MORPH BLD: (no result)
ROULEAUX BLD QL SMEAR: (no result)
SCHISTOCYTES BLD QL SMEAR: (no result)
SICKLE CELLS BLD QL SMEAR: (no result)
SODIUM SERPL-SCNC: 137 MMOL/L (ref 137–145)
SPHEROCYTES BLD QL SMEAR: (no result)
STOMATOCYTES BLD QL SMEAR: (no result)
TARGETS BLD QL SMEAR: (no result)
TOXIC GRANULES BLD QL SMEAR: (no result)
VARIANT LYMPHS BLD QL SMEAR: (no result)
WBC # BLD AUTO: 7.26 10*3/UL (ref 4.5–10)
WBC NRBC COR # BLD: (no result) K/UL
WBC TOXIC VACUOLES BLD QL SMEAR: (no result)

## 2025-07-29 PROCEDURE — 80053 COMPREHEN METABOLIC PANEL: CPT

## 2025-07-29 PROCEDURE — 36415 COLL VENOUS BLD VENIPUNCTURE: CPT

## 2025-07-29 PROCEDURE — 85730 THROMBOPLASTIN TIME PARTIAL: CPT

## 2025-07-29 PROCEDURE — 83735 ASSAY OF MAGNESIUM: CPT

## 2025-07-29 PROCEDURE — 84484 ASSAY OF TROPONIN QUANT: CPT

## 2025-07-29 PROCEDURE — 99285 EMERGENCY DEPT VISIT HI MDM: CPT

## 2025-07-29 PROCEDURE — 85025 COMPLETE CBC W/AUTO DIFF WBC: CPT

## 2025-07-29 PROCEDURE — 93005 ELECTROCARDIOGRAM TRACING: CPT

## 2025-07-29 PROCEDURE — 85610 PROTHROMBIN TIME: CPT

## 2025-07-29 PROCEDURE — 71046 X-RAY EXAM CHEST 2 VIEWS: CPT
